# Patient Record
Sex: MALE | NOT HISPANIC OR LATINO | Employment: FULL TIME | URBAN - METROPOLITAN AREA
[De-identification: names, ages, dates, MRNs, and addresses within clinical notes are randomized per-mention and may not be internally consistent; named-entity substitution may affect disease eponyms.]

---

## 2018-06-13 ENCOUNTER — APPOINTMENT (OUTPATIENT)
Dept: PREADMISSION TESTING | Facility: HOSPITAL | Age: 38
End: 2018-06-13

## 2018-06-13 VITALS
OXYGEN SATURATION: 97 % | RESPIRATION RATE: 18 BRPM | TEMPERATURE: 97.4 F | WEIGHT: 204.5 LBS | BODY MASS INDEX: 29.28 KG/M2 | HEIGHT: 70 IN | SYSTOLIC BLOOD PRESSURE: 144 MMHG | DIASTOLIC BLOOD PRESSURE: 84 MMHG | HEART RATE: 96 BPM

## 2018-06-13 LAB
ANION GAP SERPL CALCULATED.3IONS-SCNC: 14 MMOL/L
BUN BLD-MCNC: 10 MG/DL (ref 6–20)
BUN/CREAT SERPL: 9.9 (ref 7–25)
CALCIUM SPEC-SCNC: 9.6 MG/DL (ref 8.6–10.5)
CHLORIDE SERPL-SCNC: 100 MMOL/L (ref 98–107)
CO2 SERPL-SCNC: 29 MMOL/L (ref 22–29)
CREAT BLD-MCNC: 1.01 MG/DL (ref 0.76–1.27)
DEPRECATED RDW RBC AUTO: 45.7 FL (ref 37–54)
ERYTHROCYTE [DISTWIDTH] IN BLOOD BY AUTOMATED COUNT: 12.9 % (ref 11.5–14.5)
GFR SERPL CREATININE-BSD FRML MDRD: 84 ML/MIN/1.73
GLUCOSE BLD-MCNC: 87 MG/DL (ref 65–99)
HCT VFR BLD AUTO: 44 % (ref 40.4–52.2)
HGB BLD-MCNC: 14.7 G/DL (ref 13.7–17.6)
MCH RBC QN AUTO: 32.3 PG (ref 27–32.7)
MCHC RBC AUTO-ENTMCNC: 33.4 G/DL (ref 32.6–36.4)
MCV RBC AUTO: 96.7 FL (ref 79.8–96.2)
PLATELET # BLD AUTO: 171 10*3/MM3 (ref 140–500)
PMV BLD AUTO: 10.1 FL (ref 6–12)
POTASSIUM BLD-SCNC: 4.5 MMOL/L (ref 3.5–5.2)
RBC # BLD AUTO: 4.55 10*6/MM3 (ref 4.6–6)
SODIUM BLD-SCNC: 143 MMOL/L (ref 136–145)
WBC NRBC COR # BLD: 4.37 10*3/MM3 (ref 4.5–10.7)

## 2018-06-13 PROCEDURE — 93010 ELECTROCARDIOGRAM REPORT: CPT | Performed by: INTERNAL MEDICINE

## 2018-06-13 PROCEDURE — 85027 COMPLETE CBC AUTOMATED: CPT | Performed by: OTOLARYNGOLOGY

## 2018-06-13 PROCEDURE — 93005 ELECTROCARDIOGRAM TRACING: CPT

## 2018-06-13 PROCEDURE — 80048 BASIC METABOLIC PNL TOTAL CA: CPT | Performed by: OTOLARYNGOLOGY

## 2018-06-13 PROCEDURE — 36415 COLL VENOUS BLD VENIPUNCTURE: CPT

## 2018-06-13 RX ORDER — ALPRAZOLAM 0.5 MG/1
0.5 TABLET ORAL AS NEEDED
COMMUNITY

## 2018-06-13 RX ORDER — PRAVASTATIN SODIUM 20 MG
20 TABLET ORAL DAILY
COMMUNITY
End: 2019-10-23 | Stop reason: SDUPTHER

## 2018-06-13 RX ORDER — CETIRIZINE HYDROCHLORIDE 10 MG/1
10 TABLET ORAL DAILY
COMMUNITY
End: 2018-06-19 | Stop reason: HOSPADM

## 2018-06-13 RX ORDER — ESCITALOPRAM OXALATE 20 MG/1
20 TABLET ORAL DAILY
COMMUNITY
End: 2019-10-23

## 2018-06-13 NOTE — DISCHARGE INSTRUCTIONS
Take the following medications the morning of surgery with a small sip of water:  NONE    THEY WILL CALL YOU THE DAY BEFORE WITH YOUR ARRIVAL TIME.    General Instructions:  • Do not eat solid food after midnight the night before surgery.  • You may drink clear liquids day of surgery but must stop at least one hour before your hospital arrival time.  • It is beneficial for you to have a clear drink that contains carbohydrates the day of surgery.  We suggest a 12 to 20 ounce bottle of Gatorade or Powerade for non-diabetic patients or a 12 to 20 ounce bottle of G2 or Powerade Zero for diabetic patients. (Pediatric patients, are not advised to drink a 12 to 20 ounce carbohydrate drink)    Clear liquids are liquids you can see through.  Nothing red in color.     Plain water                               Sports drinks  Sodas                                   Gelatin (Jell-O)  Fruit juices without pulp such as white grape juice and apple juice  Popsicles that contain no fruit or yogurt  Tea or coffee (no cream or milk added)  Gatorade / Powerade  G2 / Powerade Zero    • Infants may have breast milk up to four hours before surgery.  • Infants drinking formula may drink formula up to six hours before surgery.   • Patients who avoid smoking, chewing tobacco and alcohol for 4 weeks prior to surgery have a reduced risk of post-operative complications.  Quit smoking as many days before surgery as you can.  • Do not smoke, use chewing tobacco or drink alcohol the day of surgery.   • If applicable bring your C-PAP/ BI-PAP machine.  • Bring any papers given to you in the doctor’s office.  • Wear clean comfortable clothes and socks.  • Do not wear contact lenses or make-up.  Bring a case for your glasses.   • Bring crutches or walker if applicable.  • Remove all piercings.  Leave jewelry and any other valuables at home.  • Hair extensions with metal clips must be removed prior to surgery.  • The Pre-Admission Testing nurse will  instruct you to bring medications if unable to obtain an accurate list in Pre-Admission Testing.        If you were given a blood bank ID arm band remember to bring it with you the day of surgery.    Preventing a Surgical Site Infection:  • For 2 to 3 days before surgery, avoid shaving with a razor because the razor can irritate skin and make it easier to develop an infection.  • The night prior to surgery sleep in a clean bed with clean clothing.  Do not allow pets to sleep with you.  • Shower on the morning of surgery using a fresh bar of anti-bacterial soap (such as Dial) and clean washcloth.  Dry with a clean towel and dress in clean clothing.  • Ask your surgeon if you will be receiving antibiotics prior to surgery.  • Make sure you, your family, and all healthcare providers clean their hands with soap and water or an alcohol based hand  before caring for you or your wound.    Day of surgery:  Upon arrival, a Pre-op nurse and Anesthesiologist will review your health history, obtain vital signs, and answer questions you may have.  The only belongings needed at this time will be your home medications and if applicable your C-PAP/BI-PAP machine.  If you are staying overnight your family can leave the rest of your belongings in the car and bring them to your room later.  A Pre-op nurse will start an IV and you may receive medication in preparation for surgery, including something to help you relax.  Your family will be able to see you in the Pre-op area.  While you are in surgery your family should notify the waiting room  if they leave the waiting room area and provide a contact phone number.    Please be aware that surgery does come with discomfort.  We want to make every effort to control your discomfort so please discuss any uncontrolled symptoms with your nurse.   Your doctor will most likely have prescribed pain medications.      If you are going home after surgery you will receive  individualized written care instructions before being discharged.  A responsible adult must drive you to and from the hospital on the day of your surgery and stay with you for 24 hours.    If you are staying overnight following surgery, you will be transported to your hospital room following the recovery period.  [facility] has all private rooms.    If you have any questions please call Pre-Admission Testing at [phone].  Deductibles and co-payments are collected on the day of service. Please be prepared to pay the required co-pay, deductible or deposit on the day of service as defined by your plan.

## 2018-06-19 ENCOUNTER — ANESTHESIA (OUTPATIENT)
Dept: PERIOP | Facility: HOSPITAL | Age: 38
End: 2018-06-19

## 2018-06-19 ENCOUNTER — HOSPITAL ENCOUNTER (OUTPATIENT)
Facility: HOSPITAL | Age: 38
Setting detail: HOSPITAL OUTPATIENT SURGERY
Discharge: HOME | End: 2018-06-19
Attending: OTOLARYNGOLOGY | Admitting: OTOLARYNGOLOGY

## 2018-06-19 ENCOUNTER — ANESTHESIA EVENT (OUTPATIENT)
Dept: PERIOP | Facility: HOSPITAL | Age: 38
End: 2018-06-19

## 2018-06-19 VITALS
BODY MASS INDEX: 29.36 KG/M2 | RESPIRATION RATE: 16 BRPM | SYSTOLIC BLOOD PRESSURE: 147 MMHG | OXYGEN SATURATION: 96 % | HEART RATE: 65 BPM | DIASTOLIC BLOOD PRESSURE: 98 MMHG | TEMPERATURE: 98.2 F | WEIGHT: 204.59 LBS

## 2018-06-19 DIAGNOSIS — R06.83 SNORING: ICD-10-CM

## 2018-06-19 PROCEDURE — 25010000002 FENTANYL CITRATE (PF) 100 MCG/2ML SOLUTION: Performed by: NURSE ANESTHETIST, CERTIFIED REGISTERED

## 2018-06-19 PROCEDURE — 25010000002 ONDANSETRON PER 1 MG: Performed by: OTOLARYNGOLOGY

## 2018-06-19 PROCEDURE — 25010000002 MIDAZOLAM PER 1 MG: Performed by: ANESTHESIOLOGY

## 2018-06-19 PROCEDURE — 25010000002 DEXAMETHASONE PER 1 MG: Performed by: NURSE ANESTHETIST, CERTIFIED REGISTERED

## 2018-06-19 PROCEDURE — C1763 CONN TISS, NON-HUMAN: HCPCS | Performed by: OTOLARYNGOLOGY

## 2018-06-19 PROCEDURE — 25010000002 ONDANSETRON PER 1 MG: Performed by: NURSE ANESTHETIST, CERTIFIED REGISTERED

## 2018-06-19 PROCEDURE — 88304 TISSUE EXAM BY PATHOLOGIST: CPT | Performed by: OTOLARYNGOLOGY

## 2018-06-19 PROCEDURE — 25010000002 PROPOFOL 10 MG/ML EMULSION: Performed by: NURSE ANESTHETIST, CERTIFIED REGISTERED

## 2018-06-19 PROCEDURE — 25010000002 EPINEPHRINE PER 0.1 MG: Performed by: OTOLARYNGOLOGY

## 2018-06-19 RX ORDER — PROMETHAZINE HYDROCHLORIDE 25 MG/1
25 TABLET ORAL ONCE AS NEEDED
Status: DISCONTINUED | OUTPATIENT
Start: 2018-06-19 | End: 2018-06-19 | Stop reason: HOSPADM

## 2018-06-19 RX ORDER — ONDANSETRON 2 MG/ML
4 INJECTION INTRAMUSCULAR; INTRAVENOUS ONCE AS NEEDED
Status: COMPLETED | OUTPATIENT
Start: 2018-06-19 | End: 2018-06-19

## 2018-06-19 RX ORDER — PROMETHAZINE HYDROCHLORIDE 25 MG/ML
12.5 INJECTION, SOLUTION INTRAMUSCULAR; INTRAVENOUS ONCE AS NEEDED
Status: DISCONTINUED | OUTPATIENT
Start: 2018-06-19 | End: 2018-06-19 | Stop reason: HOSPADM

## 2018-06-19 RX ORDER — ONDANSETRON HYDROCHLORIDE 4 MG/5ML
4 SOLUTION ORAL EVERY 6 HOURS PRN
Qty: 50 ML | Refills: 1 | Status: SHIPPED | OUTPATIENT
Start: 2018-06-19 | End: 2019-10-23

## 2018-06-19 RX ORDER — HYDROCODONE BITARTRATE AND ACETAMINOPHEN 7.5; 325 MG/1; MG/1
1 TABLET ORAL ONCE AS NEEDED
Status: DISCONTINUED | OUTPATIENT
Start: 2018-06-19 | End: 2018-06-19 | Stop reason: HOSPADM

## 2018-06-19 RX ORDER — FENTANYL CITRATE 50 UG/ML
50 INJECTION, SOLUTION INTRAMUSCULAR; INTRAVENOUS
Status: DISCONTINUED | OUTPATIENT
Start: 2018-06-19 | End: 2018-06-19 | Stop reason: HOSPADM

## 2018-06-19 RX ORDER — PROPOFOL 10 MG/ML
VIAL (ML) INTRAVENOUS AS NEEDED
Status: DISCONTINUED | OUTPATIENT
Start: 2018-06-19 | End: 2018-06-19 | Stop reason: SURG

## 2018-06-19 RX ORDER — PROMETHAZINE HYDROCHLORIDE 25 MG/1
25 SUPPOSITORY RECTAL ONCE AS NEEDED
Status: DISCONTINUED | OUTPATIENT
Start: 2018-06-19 | End: 2018-06-19 | Stop reason: HOSPADM

## 2018-06-19 RX ORDER — OXYCODONE AND ACETAMINOPHEN 7.5; 325 MG/1; MG/1
1 TABLET ORAL ONCE AS NEEDED
Status: DISCONTINUED | OUTPATIENT
Start: 2018-06-19 | End: 2018-06-19 | Stop reason: HOSPADM

## 2018-06-19 RX ORDER — ONDANSETRON 2 MG/ML
4 INJECTION INTRAMUSCULAR; INTRAVENOUS ONCE AS NEEDED
Status: DISCONTINUED | OUTPATIENT
Start: 2018-06-19 | End: 2018-06-19 | Stop reason: HOSPADM

## 2018-06-19 RX ORDER — MIDAZOLAM HYDROCHLORIDE 1 MG/ML
1 INJECTION INTRAMUSCULAR; INTRAVENOUS
Status: DISCONTINUED | OUTPATIENT
Start: 2018-06-19 | End: 2018-06-19 | Stop reason: HOSPADM

## 2018-06-19 RX ORDER — LIDOCAINE HYDROCHLORIDE 10 MG/ML
0.5 INJECTION, SOLUTION EPIDURAL; INFILTRATION; INTRACAUDAL; PERINEURAL ONCE AS NEEDED
Status: COMPLETED | OUTPATIENT
Start: 2018-06-19 | End: 2018-06-19

## 2018-06-19 RX ORDER — HYDROMORPHONE HYDROCHLORIDE 1 MG/ML
0.5 INJECTION, SOLUTION INTRAMUSCULAR; INTRAVENOUS; SUBCUTANEOUS
Status: DISCONTINUED | OUTPATIENT
Start: 2018-06-19 | End: 2018-06-19 | Stop reason: HOSPADM

## 2018-06-19 RX ORDER — LABETALOL HYDROCHLORIDE 5 MG/ML
5 INJECTION, SOLUTION INTRAVENOUS
Status: DISCONTINUED | OUTPATIENT
Start: 2018-06-19 | End: 2018-06-19 | Stop reason: HOSPADM

## 2018-06-19 RX ORDER — ONDANSETRON 2 MG/ML
INJECTION INTRAMUSCULAR; INTRAVENOUS AS NEEDED
Status: DISCONTINUED | OUTPATIENT
Start: 2018-06-19 | End: 2018-06-19 | Stop reason: SURG

## 2018-06-19 RX ORDER — SODIUM CHLORIDE/ALOE VERA
GEL (GRAM) NASAL
Status: DISCONTINUED
Start: 2018-06-19 | End: 2018-06-19 | Stop reason: WASHOUT

## 2018-06-19 RX ORDER — MIDAZOLAM HYDROCHLORIDE 1 MG/ML
2 INJECTION INTRAMUSCULAR; INTRAVENOUS
Status: DISCONTINUED | OUTPATIENT
Start: 2018-06-19 | End: 2018-06-19 | Stop reason: HOSPADM

## 2018-06-19 RX ORDER — SODIUM CHLORIDE 0.9 % (FLUSH) 0.9 %
1-10 SYRINGE (ML) INJECTION AS NEEDED
Status: DISCONTINUED | OUTPATIENT
Start: 2018-06-19 | End: 2018-06-19 | Stop reason: HOSPADM

## 2018-06-19 RX ORDER — SCOLOPAMINE TRANSDERMAL SYSTEM 1 MG/1
1 PATCH, EXTENDED RELEASE TRANSDERMAL
Status: DISCONTINUED | OUTPATIENT
Start: 2018-06-19 | End: 2018-06-19 | Stop reason: HOSPADM

## 2018-06-19 RX ORDER — BUPIVACAINE HYDROCHLORIDE AND EPINEPHRINE 2.5; 5 MG/ML; UG/ML
INJECTION, SOLUTION INFILTRATION; PERINEURAL AS NEEDED
Status: DISCONTINUED | OUTPATIENT
Start: 2018-06-19 | End: 2018-06-19 | Stop reason: HOSPADM

## 2018-06-19 RX ORDER — DEXAMETHASONE SODIUM PHOSPHATE 10 MG/ML
INJECTION INTRAMUSCULAR; INTRAVENOUS AS NEEDED
Status: DISCONTINUED | OUTPATIENT
Start: 2018-06-19 | End: 2018-06-19 | Stop reason: SURG

## 2018-06-19 RX ORDER — NALOXONE HCL 0.4 MG/ML
0.2 VIAL (ML) INJECTION AS NEEDED
Status: DISCONTINUED | OUTPATIENT
Start: 2018-06-19 | End: 2018-06-19 | Stop reason: HOSPADM

## 2018-06-19 RX ORDER — EPINEPHRINE 1 MG/ML
INJECTION, SOLUTION, CONCENTRATE INTRAVENOUS AS NEEDED
Status: DISCONTINUED | OUTPATIENT
Start: 2018-06-19 | End: 2018-06-19 | Stop reason: HOSPADM

## 2018-06-19 RX ORDER — DIPHENHYDRAMINE HYDROCHLORIDE 50 MG/ML
12.5 INJECTION INTRAMUSCULAR; INTRAVENOUS
Status: DISCONTINUED | OUTPATIENT
Start: 2018-06-19 | End: 2018-06-19 | Stop reason: HOSPADM

## 2018-06-19 RX ORDER — BACITRACIN ZINC 500 [USP'U]/G
OINTMENT TOPICAL AS NEEDED
Status: DISCONTINUED | OUTPATIENT
Start: 2018-06-19 | End: 2018-06-19 | Stop reason: HOSPADM

## 2018-06-19 RX ORDER — FAMOTIDINE 10 MG/ML
20 INJECTION, SOLUTION INTRAVENOUS ONCE
Status: COMPLETED | OUTPATIENT
Start: 2018-06-19 | End: 2018-06-19

## 2018-06-19 RX ORDER — FENTANYL CITRATE 50 UG/ML
INJECTION, SOLUTION INTRAMUSCULAR; INTRAVENOUS AS NEEDED
Status: DISCONTINUED | OUTPATIENT
Start: 2018-06-19 | End: 2018-06-19 | Stop reason: SURG

## 2018-06-19 RX ORDER — EPHEDRINE SULFATE 50 MG/ML
5 INJECTION, SOLUTION INTRAVENOUS ONCE AS NEEDED
Status: DISCONTINUED | OUTPATIENT
Start: 2018-06-19 | End: 2018-06-19 | Stop reason: HOSPADM

## 2018-06-19 RX ORDER — PROMETHAZINE HYDROCHLORIDE 25 MG/1
12.5 TABLET ORAL ONCE AS NEEDED
Status: DISCONTINUED | OUTPATIENT
Start: 2018-06-19 | End: 2018-06-19 | Stop reason: HOSPADM

## 2018-06-19 RX ORDER — LIDOCAINE HYDROCHLORIDE AND EPINEPHRINE 10; 10 MG/ML; UG/ML
INJECTION, SOLUTION INFILTRATION; PERINEURAL AS NEEDED
Status: DISCONTINUED | OUTPATIENT
Start: 2018-06-19 | End: 2018-06-19 | Stop reason: HOSPADM

## 2018-06-19 RX ORDER — SODIUM CHLORIDE 9 MG/ML
INJECTION, SOLUTION INTRAVENOUS AS NEEDED
Status: DISCONTINUED | OUTPATIENT
Start: 2018-06-19 | End: 2018-06-19 | Stop reason: HOSPADM

## 2018-06-19 RX ORDER — SODIUM CHLORIDE, SODIUM LACTATE, POTASSIUM CHLORIDE, CALCIUM CHLORIDE 600; 310; 30; 20 MG/100ML; MG/100ML; MG/100ML; MG/100ML
9 INJECTION, SOLUTION INTRAVENOUS CONTINUOUS
Status: DISCONTINUED | OUTPATIENT
Start: 2018-06-19 | End: 2018-06-19 | Stop reason: HOSPADM

## 2018-06-19 RX ORDER — HYDROCODONE BITARTRATE AND ACETAMINOPHEN 10; 325 MG/1; MG/1
1 TABLET ORAL EVERY 6 HOURS PRN
Status: DISCONTINUED | OUTPATIENT
Start: 2018-06-19 | End: 2018-06-19 | Stop reason: HOSPADM

## 2018-06-19 RX ORDER — ROCURONIUM BROMIDE 10 MG/ML
INJECTION, SOLUTION INTRAVENOUS AS NEEDED
Status: DISCONTINUED | OUTPATIENT
Start: 2018-06-19 | End: 2018-06-19 | Stop reason: SURG

## 2018-06-19 RX ORDER — LIDOCAINE HYDROCHLORIDE 20 MG/ML
INJECTION, SOLUTION INFILTRATION; PERINEURAL AS NEEDED
Status: DISCONTINUED | OUTPATIENT
Start: 2018-06-19 | End: 2018-06-19 | Stop reason: SURG

## 2018-06-19 RX ORDER — FLUMAZENIL 0.1 MG/ML
0.2 INJECTION INTRAVENOUS AS NEEDED
Status: DISCONTINUED | OUTPATIENT
Start: 2018-06-19 | End: 2018-06-19 | Stop reason: HOSPADM

## 2018-06-19 RX ADMIN — ONDANSETRON 4 MG: 2 INJECTION INTRAMUSCULAR; INTRAVENOUS at 10:27

## 2018-06-19 RX ADMIN — LIDOCAINE HYDROCHLORIDE 100 MG: 20 INJECTION, SOLUTION INFILTRATION; PERINEURAL at 09:13

## 2018-06-19 RX ADMIN — SODIUM CHLORIDE, POTASSIUM CHLORIDE, SODIUM LACTATE AND CALCIUM CHLORIDE 9 ML/HR: 600; 310; 30; 20 INJECTION, SOLUTION INTRAVENOUS at 08:11

## 2018-06-19 RX ADMIN — FENTANYL CITRATE 100 MCG: 50 INJECTION, SOLUTION INTRAMUSCULAR; INTRAVENOUS at 09:13

## 2018-06-19 RX ADMIN — SUGAMMADEX 200 MG: 100 INJECTION, SOLUTION INTRAVENOUS at 11:01

## 2018-06-19 RX ADMIN — FENTANYL CITRATE 50 MCG: 50 INJECTION, SOLUTION INTRAMUSCULAR; INTRAVENOUS at 11:10

## 2018-06-19 RX ADMIN — FENTANYL CITRATE 50 MCG: 50 INJECTION, SOLUTION INTRAMUSCULAR; INTRAVENOUS at 11:29

## 2018-06-19 RX ADMIN — ROCURONIUM BROMIDE 50 MG: 10 INJECTION INTRAVENOUS at 09:13

## 2018-06-19 RX ADMIN — FAMOTIDINE 20 MG: 10 INJECTION, SOLUTION INTRAVENOUS at 08:12

## 2018-06-19 RX ADMIN — MIDAZOLAM 1 MG: 1 INJECTION INTRAMUSCULAR; INTRAVENOUS at 08:17

## 2018-06-19 RX ADMIN — FENTANYL CITRATE 50 MCG: 50 INJECTION, SOLUTION INTRAMUSCULAR; INTRAVENOUS at 10:27

## 2018-06-19 RX ADMIN — FENTANYL CITRATE 50 MCG: 50 INJECTION, SOLUTION INTRAMUSCULAR; INTRAVENOUS at 11:39

## 2018-06-19 RX ADMIN — SCOPOLAMINE 1 PATCH: 1 PATCH, EXTENDED RELEASE TRANSDERMAL at 08:12

## 2018-06-19 RX ADMIN — LIDOCAINE HYDROCHLORIDE 0.5 ML: 10 INJECTION, SOLUTION EPIDURAL; INFILTRATION; INTRACAUDAL; PERINEURAL at 08:11

## 2018-06-19 RX ADMIN — FENTANYL CITRATE 50 MCG: 50 INJECTION, SOLUTION INTRAMUSCULAR; INTRAVENOUS at 10:59

## 2018-06-19 RX ADMIN — DEXAMETHASONE SODIUM PHOSPHATE 8 MG: 10 INJECTION INTRAMUSCULAR; INTRAVENOUS at 10:27

## 2018-06-19 RX ADMIN — PROPOFOL 200 MG: 10 INJECTION, EMULSION INTRAVENOUS at 09:13

## 2018-06-19 RX ADMIN — ONDANSETRON 4 MG: 2 INJECTION, SOLUTION INTRAMUSCULAR; INTRAVENOUS at 12:41

## 2018-06-19 RX ADMIN — MIDAZOLAM 1 MG: 1 INJECTION INTRAMUSCULAR; INTRAVENOUS at 08:12

## 2018-06-19 RX ADMIN — HYDROCODONE BITARTRATE AND ACETAMINOPHEN 15 ML: 7.5; 325 SOLUTION ORAL at 12:01

## 2018-06-19 RX ADMIN — PROPOFOL 100 MG: 10 INJECTION, EMULSION INTRAVENOUS at 09:16

## 2018-06-19 NOTE — BRIEF OP NOTE
SEPTOPLASTY, TONSILLECTOMY AND ADENOIDECTOMY  Progress Note    Altagracia Berry  6/19/2018    Pre-op Diagnosis:   Sleep apnea; nasal obstruction       Post-Op Diagnosis Codes:   same    Procedure/CPT® Codes:      Procedure(s):  SEPTOPLASTY TURBINATE COBLATION  TONSILLECTOMY    Surgeon(s):  Delano Nunez MD    Anesthesia: General    Staff:   Circulator: Melonie Ascenico RN; Nimo Gant RN  Scrub Person: Mi Aguirre    Estimated Blood Loss: 200 mL    Urine Voided: 0 mL    Specimens:                ID Type Source Tests Collected by Time   A : Right Tonsil Tissue Tonsils TISSUE PATHOLOGY EXAM Delano Nunez MD 6/19/2018 1037   B : Left Tonsil Tissue Tonsils TISSUE PATHOLOGY EXAM Delano Nunez MD 6/19/2018 1037         Drains:      Findings: severely deviated septum with multiple fractures of quadrangular cartilage    Complications: none      Delano Nunez MD     Date: 6/19/2018  Time: 11:19 AM

## 2018-06-19 NOTE — ANESTHESIA PROCEDURE NOTES
Airway  Urgency: elective    Airway not difficult    General Information and Staff    Patient location during procedure: OR  Anesthesiologist: TYLOR YOUNG  CRNA: DARY VALDEZ    Indications and Patient Condition  Indications for airway management: airway protection    Preoxygenated: yes  MILS not maintained throughout  Mask difficulty assessment: 1 - vent by mask    Final Airway Details  Final airway type: endotracheal airway      Successful airway: ETT and LUCIA tube  Cuffed: yes   Successful intubation technique: direct laryngoscopy  Facilitating devices/methods: intubating stylet  Endotracheal tube insertion site: oral  Blade: Casas  Blade size: #2  ETT size: 7.5 mm  Cormack-Lehane Classification: grade I - full view of glottis  Placement verified by: chest auscultation and capnometry   Cuff volume (mL): 10  Measured from: lips  Number of attempts at approach: 1    Additional Comments  Ett placed easily, appears atraumatic, dentition intact

## 2018-06-19 NOTE — OP NOTE
Operative note    Preoperative diagnosis: Deviated nasal septum with nasal obstruction; obstructive sleep apnea; tonsillar hypertrophy; bilateral inferior turbinate hypertrophy    Postoperative diagnosis: Same    Surgeon: Himanshu Nunez    Procedure: Septoplasty, submucous resection technique; bilateral inferior turbinate Coblation; tonsillectomy; palatoplasty    Complications: None    Blood loss: Minimal    Specimen: Tonsils ×2    Description: Patient was placed supine on the operating table where general anesthetic was administered.  The septum was injected with 1% lidocaine 1 200,000 epinephrine.    The patient was prepped and draped in sterile fashion.    Vertical septal incision was made very carefully on the left.  The patient's quadrangular cartilage was buckled and fractured with severe angulation.  We completed very meticulous elevation of the soft tissue off these collagenous fragments.  There were some areas of exposed cartilage on the left side thus leaving a small hole in the mucosal flap.  We  the bony cartilaginous junction and then removed segments of severely deviated bone with Og forceps.  I then began meticulously dissecting the fragments of cartilage with a sharp [place] elevator fragments were removed as necessary.  The very anterior portion of the cartilage was somewhat crushed and deviated to the right.  We are we aligned this to the extent possible.    Once we were pleased with the positioning of the septum we irrigated the pocket with cool saline.  I closed the vertical incision very carefully with interrupted 4-0 chromic suture.  I then used a 40 plain suture on a small Jamar needle to further support the cartilaginous fragments and close the septal flap.    We flushed the nasal cavity with saline.  Inferior turbinate Coblation was then completed in the following manner.  The turbinate mucosa was injected with 1% lidocaine 1 200,000 epinephrine to inflate the erectile tissue  of the structure.    The ArthroCare inferior turbinate Coblation wand was then submitted into the membranous portion of the turbinate to its fullest extent.  5 isolated lesions of approximately 8 seconds duration were completed at a setting of 4 Coblation.    Good tissue volume reduction was observed.    Inferior meatal packs were then placed and the strings from the packs were secured to the skin of the face.    We then repeat positioned for the work in the pharynx.  The Crow Ben gag was introduced into the oral cavity.  The tonsils were removed in standard fashion with needlepoint cautery bleeding was minimal I then made a posterior backcut into the posterior tonsillar pillar on both sides this created a rectangular shaped opening into the pharynx I trimmed approximately one third the length of the uvula.    All mucosal cuts were then closed meticulously with interrupted 3-0 Vicryl suture we again flushed with saline the gag was withdrawn we checked for bleeding.  The patient was awakened and returned to recovery.  All of her needle counts were correct.

## 2018-06-19 NOTE — ANESTHESIA POSTPROCEDURE EVALUATION
Patient: Altagracia Berry    Procedure Summary     Date:  06/19/18 Room / Location:   [facility]    Anesthesia Start:  0910 Anesthesia Stop:  1119    Procedures:       SEPTOPLASTY TURBINATE COBLATION (Bilateral Nose)      TONSILLECTOMY (Bilateral Throat) Diagnosis:      Surgeon:  Delano Nunez MD Provider:  Patricio Arreaga MD    Anesthesia Type:  general ASA Status:  2          Anesthesia Type: general  Last vitals  BP   150/96 (06/19/18 1145)   Temp   36.4 °C (97.6 °F) (06/19/18 1118)   Pulse   75 (06/19/18 1145)   Resp   16 (06/19/18 1145)     SpO2   94 % (06/19/18 1145)     Post Anesthesia Care and Evaluation    Patient location during evaluation: PACU  Patient participation: complete - patient participated  Level of consciousness: awake and alert  Pain management: adequate  Airway patency: patent  Anesthetic complications: No anesthetic complications    Cardiovascular status: acceptable  Respiratory status: acceptable  Hydration status: acceptable    Comments: --------------------            06/19/18               1145     --------------------   BP:       150/96     Pulse:      75       Resp:       16       Temp:                SpO2:      94%      --------------------

## 2018-06-19 NOTE — ANESTHESIA PREPROCEDURE EVALUATION
Anesthesia Evaluation     Patient summary reviewed and Nursing notes reviewed                Airway   Mallampati: II  Dental      Pulmonary    (+) a smoker Former, sleep apnea,   Cardiovascular     ECG reviewed  Rhythm: regular  Rate: normal    (+) hyperlipidemia,       Neuro/Psych  (+) headaches, psychiatric history,     GI/Hepatic/Renal/Endo - negative ROS     Musculoskeletal (-) negative ROS    Abdominal    Substance History - negative use     OB/GYN negative ob/gyn ROS         Other                        Anesthesia Plan    ASA 2     general     intravenous induction   Anesthetic plan and risks discussed with patient.

## 2018-06-20 LAB
CYTO UR: NORMAL
LAB AP CASE REPORT: NORMAL
PATH REPORT.FINAL DX SPEC: NORMAL
PATH REPORT.GROSS SPEC: NORMAL

## 2019-10-21 RX ORDER — CETIRIZINE HYDROCHLORIDE 10 MG/1
10 TABLET ORAL DAILY
Start: 2019-10-21

## 2019-10-21 RX ORDER — SILDENAFIL 100 MG/1
100 TABLET, FILM COATED ORAL DAILY PRN
Start: 2019-10-21

## 2019-10-23 ENCOUNTER — OFFICE VISIT (OUTPATIENT)
Dept: FAMILY MEDICINE CLINIC | Facility: CLINIC | Age: 39
End: 2019-10-23

## 2019-10-23 VITALS
OXYGEN SATURATION: 97 % | BODY MASS INDEX: 31.07 KG/M2 | WEIGHT: 217 LBS | TEMPERATURE: 98.4 F | HEART RATE: 80 BPM | DIASTOLIC BLOOD PRESSURE: 91 MMHG | HEIGHT: 70 IN | SYSTOLIC BLOOD PRESSURE: 143 MMHG

## 2019-10-23 DIAGNOSIS — F41.1 GAD (GENERALIZED ANXIETY DISORDER): ICD-10-CM

## 2019-10-23 DIAGNOSIS — N52.9 ERECTILE DYSFUNCTION, UNSPECIFIED ERECTILE DYSFUNCTION TYPE: ICD-10-CM

## 2019-10-23 DIAGNOSIS — R00.0 TACHYCARDIA: ICD-10-CM

## 2019-10-23 DIAGNOSIS — E78.2 MIXED HYPERLIPIDEMIA: ICD-10-CM

## 2019-10-23 DIAGNOSIS — Z23 NEED FOR INFLUENZA VACCINATION: Primary | ICD-10-CM

## 2019-10-23 PROCEDURE — 90471 IMMUNIZATION ADMIN: CPT | Performed by: FAMILY MEDICINE

## 2019-10-23 PROCEDURE — 90674 CCIIV4 VAC NO PRSV 0.5 ML IM: CPT | Performed by: FAMILY MEDICINE

## 2019-10-23 PROCEDURE — 99214 OFFICE O/P EST MOD 30 MIN: CPT | Performed by: FAMILY MEDICINE

## 2019-10-23 RX ORDER — VILAZODONE HYDROCHLORIDE 40 MG/1
40 TABLET ORAL DAILY
Refills: 2 | COMMUNITY
Start: 2019-09-03

## 2019-10-23 RX ORDER — PRAVASTATIN SODIUM 20 MG
20 TABLET ORAL DAILY
Qty: 90 TABLET | Refills: 3 | Status: SHIPPED | OUTPATIENT
Start: 2019-10-23 | End: 2020-11-03

## 2019-10-23 RX ORDER — TRAZODONE HYDROCHLORIDE 100 MG/1
100 TABLET ORAL
Refills: 1 | COMMUNITY
Start: 2019-09-03

## 2019-10-23 RX ORDER — BUPROPION HYDROCHLORIDE 150 MG/1
150 TABLET ORAL DAILY
Refills: 1 | COMMUNITY
Start: 2019-10-02

## 2019-10-23 NOTE — PROGRESS NOTES
Chief Complaint   Patient presents with   • Depression   • Anxiety   • Hyperlipidemia       Subjective   Altagracia Berry is a 38 y.o. male.     History of Present Illness   F/U LUCÍA.  Seeing [facility] psychiatry now.  I am doing well with [employer] campaign.    F/U hyperlipidemia.  Doing well with meds.    F/U ED.  Doing well with meds.  I am doing Clomid for the low testosterone through the urology.    Fu elevated BP.  Checking BP at home and numbers running 100-110/60s and doing well.    C/o trouble with fast heartrate that occurs about every two months or so.  Lasts minutes.  Occurs with exercise.  Stress treadmill normal in the past.  Picked up on apple watch with HR up to 200 or so.    Resolved in 2 minutes or so with rest.      The following portions of the patient's history were reviewed and updated as appropriate: allergies, current medications, past family history, past medical history, past social history, past surgical history and problem list.    Review of Systems   Constitutional: Negative for appetite change and fatigue.   HENT: Negative for nosebleeds and sore throat.    Eyes: Negative for blurred vision and visual disturbance.   Respiratory: Negative for shortness of breath and wheezing.    Cardiovascular: Negative for chest pain and leg swelling.   Gastrointestinal: Negative for abdominal distention and abdominal pain.   Endocrine: Negative for cold intolerance and polyuria.   Genitourinary: Negative for dysuria and hematuria.   Musculoskeletal: Negative for arthralgias and myalgias.   Skin: Negative for color change and rash.   Neurological: Negative for weakness and confusion.   Psychiatric/Behavioral: Negative for agitation and depressed mood.       Patient Active Problem List   Diagnosis   • LUCÍA (generalized anxiety disorder)   • Erectile dysfunction   • Allergic rhinitis   • Hyperlipidemia   • Panic attacks   • Sleep apnea   • Thrombocytopenia (CMS/HCC)   • Tachycardia       No Known  Allergies      Current Outpatient Medications:   •  ALPRAZolam (XANAX) 0.5 MG tablet, Take 0.5 mg by mouth As Needed for Anxiety., Disp: , Rfl:   •  buPROPion XL (WELLBUTRIN XL) 150 MG 24 hr tablet, Take 150 mg by mouth Daily., Disp: , Rfl: 1  •  cetirizine (zyrTEC) 10 MG tablet, Take 1 tablet by mouth Daily., Disp: , Rfl:   •  clomiPHENE (CLOMID) 50 MG tablet, Take 25 mg by mouth Daily., Disp: , Rfl: 5  •  pravastatin (PRAVACHOL) 20 MG tablet, Take 1 tablet by mouth Daily., Disp: 90 tablet, Rfl: 3  •  sildenafil (VIAGRA) 100 MG tablet, Take 1 tablet by mouth Daily As Needed for erectile dysfunction., Disp: , Rfl:   •  traZODone (DESYREL) 100 MG tablet, Take 100 mg by mouth every night at bedtime., Disp: , Rfl: 1  •  VIIBRYD 40 MG tablet tablet, Take 40 mg by mouth Daily., Disp: , Rfl: 2  •  HYDROcodone-acetaminophen (HYCET) 7.5-325 MG/15ML solution, Take 15 mL by mouth Every 4 (Four) Hours As Needed for Moderate Pain ., Disp: 400 mL, Rfl: 0  No current facility-administered medications for this visit.     Past Medical History:   Diagnosis Date   • Allergic rhinitis    • Anxiety and depression    • Decreased libido    • Deviated septum    • Eczema    • Erectile dysfunction    • LUCÍA (generalized anxiety disorder)    • History of exercise stress test    • History of myringotomy    • History of palpitations     SAW DR DELONG, HAD NEGATIVE STRESS TEST, WAS DETERMINED TO BE ANXIETY   • Hypercholesterolemia    • Hyperlipidemia    • Hypertrophy tonsils    • Immunization deficiency    • Migraine    • Palpitations    • Panic attacks    • Patellofemoral syndrome    • Sebaceous cyst    • Sleep apnea    • Sore throat    • Thrombocytopenia (CMS/HCC)        Past Surgical History:   Procedure Laterality Date   • MYRINGOTOMY      x 2   • SEPTOPLASTY Bilateral 6/19/2018    Procedure: SEPTOPLASTY TURBINATE COBLATION;  Surgeon: Delano Nunez MD;  Location: [facility] OR [facility] [place];  Service: ENT   • TONSILLECTOMY AND ADENOIDECTOMY  Bilateral 6/19/2018    Procedure: TONSILLECTOMY;  Surgeon: Delano Nunez MD;  [place] [facility] [place] [facility] [place]   • WISDOM TOOTH EXTRACTION         Family History   Problem Relation Age of Onset   • Breast cancer Mother    • Leukemia Father         chronic lymphoid leukemia   • No Known Problems Other    • Malig Hyperthermia Neg Hx        Social History     Tobacco Use   • Smoking status: Former Smoker     Packs/day: 1.00     Years: 9.00     Pack years: 9.00     Types: Cigarettes   • Smokeless tobacco: Current User     Types: Chew   • Tobacco comment: QUIT 9 YEARS AGO   Substance Use Topics   • Alcohol use: Yes     Comment: SOCIALLY-2 drinks per week            Objective     Vitals:    10/23/19 0913   BP: 143/91   Pulse: 80   Temp: 98.4 °F (36.9 °C)   SpO2: 97%     Body mass index is 31.14 kg/m².    Physical Exam   Constitutional: He appears well-developed and well-nourished.   HENT:   Head: Normocephalic and atraumatic.   Mouth/Throat: Oropharynx is clear and moist.   Eyes: Pupils are equal, round, and reactive to light. No scleral icterus.   Neck: No thyromegaly present.   Cardiovascular: Normal rate and regular rhythm. Exam reveals no gallop and no friction rub.   No murmur heard.  Pulmonary/Chest: Effort normal. No respiratory distress. He has no wheezes. He has no rales. He exhibits no tenderness.   Abdominal: Soft. Bowel sounds are normal. He exhibits no distension. There is no tenderness.   Musculoskeletal: Normal range of motion. He exhibits no edema or deformity.   Lymphadenopathy:     He has no cervical adenopathy.   Neurological: No cranial nerve deficit. He exhibits normal muscle tone.   Skin: Skin is warm and dry. No rash noted. He is not diaphoretic.   Vitals reviewed.      Lab Results   Component Value Date    GLUCOSE 87 06/13/2018    BUN 10 06/13/2018    CREATININE 1.01 06/13/2018    EGFRIFNONA 84 06/13/2018    BCR 9.9 06/13/2018    K 4.5 06/13/2018    CO2 29.0 06/13/2018    CALCIUM 9.6  06/13/2018       WBC   Date Value Ref Range Status   06/13/2018 4.37 (L) 4.50 - 10.70 10*3/mm3 Final     RBC   Date Value Ref Range Status   06/13/2018 4.55 (L) 4.60 - 6.00 10*6/mm3 Final     Hemoglobin   Date Value Ref Range Status   06/13/2018 14.7 13.7 - 17.6 g/dL Final     Hematocrit   Date Value Ref Range Status   06/13/2018 44.0 40.4 - 52.2 % Final     MCV   Date Value Ref Range Status   06/13/2018 96.7 (H) 79.8 - 96.2 fL Final     MCH   Date Value Ref Range Status   06/13/2018 32.3 27.0 - 32.7 pg Final     MCHC   Date Value Ref Range Status   06/13/2018 33.4 32.6 - 36.4 g/dL Final     RDW   Date Value Ref Range Status   06/13/2018 12.9 11.5 - 14.5 % Final     RDW-SD   Date Value Ref Range Status   06/13/2018 45.7 37.0 - 54.0 fl Final     MPV   Date Value Ref Range Status   06/13/2018 10.1 6.0 - 12.0 fL Final     Platelets   Date Value Ref Range Status   06/13/2018 171 140 - 500 10*3/mm3 Final       No results found for: HGBA1C    No results found for: GBBLGWJS17    No results found for: TSH    No results found for: CHOL  No results found for: TRIG  No results found for: HDL  No results found for: LDL  No results found for: VLDL  No results found for: LDLHDL      Procedures    Assessment/Plan   Problems Addressed this Visit        Cardiovascular and Mediastinum    Hyperlipidemia    Relevant Medications    pravastatin (PRAVACHOL) 20 MG tablet    Other Relevant Orders    Comprehensive Metabolic Panel    Lipid Panel With / Chol / HDL Ratio    TSH Rfx On Abnormal To Free T4    Tachycardia       Other    LUCÍA (generalized anxiety disorder)    Relevant Medications    buPROPion XL (WELLBUTRIN XL) 150 MG 24 hr tablet    traZODone (DESYREL) 100 MG tablet    VIIBRYD 40 MG tablet tablet    Erectile dysfunction      Other Visit Diagnoses     Need for influenza vaccination    -  Primary    Relevant Medications    influenza vac split quad (FLUZONE,FLUARIX,AFLURIA) injection 0.5 mL (Completed)          Orders Placed This  Encounter   Procedures   • Comprehensive Metabolic Panel   • Lipid Panel With / Chol / HDL Ratio   • TSH Rfx On Abnormal To Free T4       Current Outpatient Medications   Medication Sig Dispense Refill   • ALPRAZolam (XANAX) 0.5 MG tablet Take 0.5 mg by mouth As Needed for Anxiety.     • buPROPion XL (WELLBUTRIN XL) 150 MG 24 hr tablet Take 150 mg by mouth Daily.  1   • cetirizine (zyrTEC) 10 MG tablet Take 1 tablet by mouth Daily.     • clomiPHENE (CLOMID) 50 MG tablet Take 25 mg by mouth Daily.  5   • pravastatin (PRAVACHOL) 20 MG tablet Take 1 tablet by mouth Daily. 90 tablet 3   • sildenafil (VIAGRA) 100 MG tablet Take 1 tablet by mouth Daily As Needed for erectile dysfunction.     • traZODone (DESYREL) 100 MG tablet Take 100 mg by mouth every night at bedtime.  1   • VIIBRYD 40 MG tablet tablet Take 40 mg by mouth Daily.  2   • HYDROcodone-acetaminophen (HYCET) 7.5-325 MG/15ML solution Take 15 mL by mouth Every 4 (Four) Hours As Needed for Moderate Pain . 400 mL 0     No current facility-administered medications for this visit.        We administered influenza vac split quad.    Return in about 6 months (around 4/23/2020).    There are no Patient Instructions on file for this visit.

## 2019-10-24 LAB
ALBUMIN SERPL-MCNC: 5 G/DL (ref 3.5–5.2)
ALBUMIN/GLOB SERPL: 2.1 G/DL
ALP SERPL-CCNC: 47 U/L (ref 39–117)
ALT SERPL-CCNC: 33 U/L (ref 1–41)
AST SERPL-CCNC: 22 U/L (ref 1–40)
BILIRUB SERPL-MCNC: 0.2 MG/DL (ref 0.2–1.2)
BUN SERPL-MCNC: 11 MG/DL (ref 6–20)
BUN/CREAT SERPL: 8.5 (ref 7–25)
CALCIUM SERPL-MCNC: 9.6 MG/DL (ref 8.6–10.5)
CHLORIDE SERPL-SCNC: 103 MMOL/L (ref 98–107)
CHOLEST SERPL-MCNC: 170 MG/DL (ref 0–200)
CHOLEST/HDLC SERPL: 3.95 {RATIO}
CO2 SERPL-SCNC: 27.8 MMOL/L (ref 22–29)
CREAT SERPL-MCNC: 1.29 MG/DL (ref 0.76–1.27)
GLOBULIN SER CALC-MCNC: 2.4 GM/DL
GLUCOSE SERPL-MCNC: 104 MG/DL (ref 65–99)
HDLC SERPL-MCNC: 43 MG/DL (ref 40–60)
LDLC SERPL CALC-MCNC: 111 MG/DL (ref 0–100)
POTASSIUM SERPL-SCNC: 4.8 MMOL/L (ref 3.5–5.2)
PROT SERPL-MCNC: 7.4 G/DL (ref 6–8.5)
SODIUM SERPL-SCNC: 143 MMOL/L (ref 136–145)
TRIGL SERPL-MCNC: 82 MG/DL (ref 0–150)
TSH SERPL DL<=0.005 MIU/L-ACNC: 2.95 UIU/ML (ref 0.27–4.2)
VLDLC SERPL CALC-MCNC: 16.4 MG/DL

## 2019-10-24 NOTE — PROGRESS NOTES
Your labs look great.  The slightly elevated creatinine is of no concern(as some labs go up to 1.3 as normal).  The lgucose and LDL are of no concern as well.  Stay on the same plan as we discussed.  Thanks.

## 2019-11-11 ENCOUNTER — OFFICE VISIT (OUTPATIENT)
Dept: CARDIOLOGY | Facility: CLINIC | Age: 39
End: 2019-11-11

## 2019-11-11 VITALS
HEART RATE: 72 BPM | HEIGHT: 70 IN | SYSTOLIC BLOOD PRESSURE: 130 MMHG | BODY MASS INDEX: 30.64 KG/M2 | WEIGHT: 214 LBS | DIASTOLIC BLOOD PRESSURE: 84 MMHG

## 2019-11-11 DIAGNOSIS — R00.0 TACHYCARDIA: ICD-10-CM

## 2019-11-11 DIAGNOSIS — R00.2 PALPITATIONS: Primary | ICD-10-CM

## 2019-11-11 PROCEDURE — 99203 OFFICE O/P NEW LOW 30 MIN: CPT | Performed by: INTERNAL MEDICINE

## 2019-11-11 PROCEDURE — 93000 ELECTROCARDIOGRAM COMPLETE: CPT | Performed by: INTERNAL MEDICINE

## 2019-11-11 NOTE — PROGRESS NOTES
Subjective:     Encounter Date:11/11/19      Patient ID: Altagracia Berry is a 39 y.o. male.    Chief Complaint:palpitations and tachycardia  History of Present Illness    Dear Dr. Layne,    I had the pleasure of seeing this patient in the office today for initial evaluation and consultation.  I appreciate that you sent him in to see us.  They come in today to be seen for palpitations and tachycardia.    Patient states for many years he has had episodes were every now and then his heart rate would suddenly jump up.  This is exclusively associated with exercise.  He thinks it is a regular rather than irregular but the duration is short.  As soon as occurs he will sit down take some deep breaths and then his heart rate is back to normal.  He was previously seen in 2017 by Dr. Quintero.  He had a stress test performed that was normal.  He states he will very rarely actually have these episodes.  Sometimes months will go by without it occurring.  He exercises most days of the week.  He now has an apple watch and one time he had his watch on one when the spells occurred.  His heart rate jumped up in the watch said it was about 190 and the next reading was about 160 or 170 and then the next reading he has is during cooldown with his heart rate is 60.  He does not have a current generation  iWatch.  When 1 of these episodes occurs he feels weak but otherwise has no symptoms.  He is never passed out.  No family history of sudden death.    He has no known cardiac history.  This patient has no history of coronary artery disease, congestive heart failure, rheumatic fever, rheumatic heart disease, congenital heart disease or heart murmur.  This patient has never required invasive cardiovascular evaluation.      The following portions of the patient's history were reviewed and updated as appropriate: allergies, current medications, past family history, past medical history, past social history, past surgical history and  problem list.    Past Medical History:   Diagnosis Date   • Allergic rhinitis    • Anxiety and depression    • Decreased libido    • Deviated septum    • Eczema    • Erectile dysfunction    • LUCÍA (generalized anxiety disorder)    • History of exercise stress test    • History of myringotomy    • History of palpitations     SAW DR DELONG, HAD NEGATIVE STRESS TEST, WAS DETERMINED TO BE ANXIETY   • Hypercholesterolemia    • Hyperlipidemia    • Hypertrophy tonsils    • Immunization deficiency    • Migraine    • Palpitations    • Panic attacks    • Patellofemoral syndrome    • Sebaceous cyst    • Sleep apnea    • Sore throat    • Thrombocytopenia (CMS/HCC)        Past Surgical History:   Procedure Laterality Date   • EAR TUBES     • MYRINGOTOMY      x 2   • SEPTOPLASTY Bilateral 6/19/2018    Procedure: SEPTOPLASTY TURBINATE COBLATION;  Surgeon: Delano Nunez MD;  [place] [facility] [place] [facility] [place]  Service: ENT   • TONSILLECTOMY AND ADENOIDECTOMY Bilateral 6/19/2018    Procedure: TONSILLECTOMY;  Surgeon: Delano Nunez MD;  [place] [facility] [place] [facility] [place];  Service: ENT   • WISDOM TOOTH EXTRACTION         Social History     Socioeconomic History   • Marital status:      Spouse name: Not on file   • Number of children: Not on file   • Years of education: Not on file   • Highest education level: Not on file   Tobacco Use   • Smoking status: Former Smoker     Packs/day: 1.00     Years: 9.00     Pack years: 9.00     Types: Cigarettes   • Smokeless tobacco: Current User     Types: Chew   • Tobacco comment: QUIT 9 YEARS AGO   Substance and Sexual Activity   • Alcohol use: Yes     Comment: SOCIALLY-2 drinks per week   • Drug use: No   • Sexual activity: Defer       Review of Systems   Constitution: Negative for chills, decreased appetite, fever and night sweats.   HENT: Negative for ear discharge, ear pain, hearing loss, nosebleeds and sore throat.    Eyes: Negative for blurred vision, double vision and pain.  "  Cardiovascular: Negative for cyanosis.   Respiratory: Negative for hemoptysis and sputum production.    Endocrine: Negative for cold intolerance and heat intolerance.   Hematologic/Lymphatic: Negative for adenopathy.   Skin: Negative for dry skin, itching, nail changes, rash and suspicious lesions.   Musculoskeletal: Negative for arthritis, gout, muscle cramps, muscle weakness, myalgias and neck pain.   Gastrointestinal: Negative for anorexia, bowel incontinence, constipation, diarrhea, dysphagia, hematemesis and jaundice.   Genitourinary: Negative for bladder incontinence, dysuria, flank pain, frequency, hematuria and nocturia.   Neurological: Negative for focal weakness, numbness, paresthesias and seizures.   Psychiatric/Behavioral: Negative for altered mental status, hallucinations, hypervigilance, suicidal ideas and thoughts of violence.   Allergic/Immunologic: Negative for persistent infections.         ECG 12 Lead  Date/Time: 11/11/2019 9:36 AM  Performed by: Shane Hdz III, MD  Authorized by: Shane Hdz III, MD   Comparison: compared with previous ECG   Similar to previous ECG  Rhythm: sinus rhythm  Rate: normal  Conduction: conduction normal  ST Segments: ST segments normal  T Waves: T waves normal  QRS axis: normal  Other: no other findings    Clinical impression: normal ECG               Objective:     Vitals:    11/11/19 0920   BP: 130/84   Pulse: 72   Weight: 97.1 kg (214 lb)   Height: 177.8 cm (70\")         Physical Exam   Constitutional: He is oriented to person, place, and time. He appears well-developed and well-nourished. No distress.   HENT:   Head: Normocephalic and atraumatic.   Nose: Nose normal.   Mouth/Throat: Oropharynx is clear and moist.   Eyes: Conjunctivae and EOM are normal. Pupils are equal, round, and reactive to light. Right eye exhibits no discharge. Left eye exhibits no discharge.   Neck: Normal range of motion. Neck supple. No tracheal deviation present. No thyromegaly " present.   Cardiovascular: Normal rate, regular rhythm, S1 normal, S2 normal, normal heart sounds and normal pulses. Exam reveals no S3.   Pulmonary/Chest: Effort normal and breath sounds normal. No stridor. No respiratory distress. He exhibits no tenderness.   Abdominal: Soft. Bowel sounds are normal. He exhibits no distension and no mass. There is no tenderness. There is no rebound and no guarding.   Musculoskeletal: Normal range of motion. He exhibits no tenderness or deformity.   Lymphadenopathy:     He has no cervical adenopathy.   Neurological: He is alert and oriented to person, place, and time. He has normal reflexes.   Skin: Skin is warm and dry. No rash noted. He is not diaphoretic. No erythema.   Psychiatric: He has a normal mood and affect. Thought content normal.       Lab Review:             Performed        Assessment:          Diagnosis Plan   1. Palpitations  ECG 12 Lead   2. Tachycardia  ECG 12 Lead          Plan:       Tachycardia- fairly infrequent.  We need to capture 1 of these episodes.  I am going to call over to his prior cardiologist to get his diagnostic testing.  We discussed different testing options.  He is going to look into the [employer] monitor.    Thank you very much for allowing us to participate in the care of this pleasant patient.  Please don't hesitate to call if I can be of assistance in any way.      Current Outpatient Medications:   •  ALPRAZolam (XANAX) 0.5 MG tablet, Take 0.5 mg by mouth As Needed for Anxiety., Disp: , Rfl:   •  buPROPion XL (WELLBUTRIN XL) 150 MG 24 hr tablet, Take 150 mg by mouth Daily., Disp: , Rfl: 1  •  cetirizine (zyrTEC) 10 MG tablet, Take 1 tablet by mouth Daily., Disp: , Rfl:   •  clomiPHENE (CLOMID) 50 MG tablet, Take 25 mg by mouth Daily., Disp: , Rfl: 5  •  pravastatin (PRAVACHOL) 20 MG tablet, Take 1 tablet by mouth Daily., Disp: 90 tablet, Rfl: 3  •  sildenafil (VIAGRA) 100 MG tablet, Take 1 tablet by mouth Daily As Needed for erectile  dysfunction., Disp: , Rfl:   •  traZODone (DESYREL) 100 MG tablet, Take 100 mg by mouth every night at bedtime., Disp: , Rfl: 1  •  VIIBRYD 40 MG tablet tablet, Take 40 mg by mouth Daily., Disp: , Rfl: 2         No Follow-up on file.

## 2020-04-28 ENCOUNTER — APPOINTMENT (OUTPATIENT)
Dept: GENERAL RADIOLOGY | Facility: HOSPITAL | Age: 40
End: 2020-04-28

## 2020-04-28 ENCOUNTER — TELEPHONE (OUTPATIENT)
Dept: CARDIOLOGY | Facility: CLINIC | Age: 40
End: 2020-04-28

## 2020-04-28 ENCOUNTER — HOSPITAL ENCOUNTER (EMERGENCY)
Facility: HOSPITAL | Age: 40
Discharge: HOME | End: 2020-04-28
Attending: EMERGENCY MEDICINE | Admitting: EMERGENCY MEDICINE

## 2020-04-28 VITALS
HEIGHT: 70 IN | TEMPERATURE: 97.2 F | WEIGHT: 201 LBS | RESPIRATION RATE: 20 BRPM | SYSTOLIC BLOOD PRESSURE: 141 MMHG | HEART RATE: 101 BPM | BODY MASS INDEX: 28.77 KG/M2 | OXYGEN SATURATION: 94 % | DIASTOLIC BLOOD PRESSURE: 105 MMHG

## 2020-04-28 DIAGNOSIS — R00.2 PALPITATIONS: Primary | ICD-10-CM

## 2020-04-28 DIAGNOSIS — F43.0 STRESS RESPONSE: ICD-10-CM

## 2020-04-28 LAB
ANION GAP SERPL CALCULATED.3IONS-SCNC: 14.3 MMOL/L (ref 5–15)
BASOPHILS # BLD AUTO: 0.02 10*3/MM3 (ref 0–0.2)
BASOPHILS NFR BLD AUTO: 0.4 % (ref 0–1.5)
BUN BLD-MCNC: 8 MG/DL (ref 6–20)
BUN/CREAT SERPL: 7.7 (ref 7–25)
CALCIUM SPEC-SCNC: 8.9 MG/DL (ref 8.6–10.5)
CHLORIDE SERPL-SCNC: 104 MMOL/L (ref 98–107)
CO2 SERPL-SCNC: 26.7 MMOL/L (ref 22–29)
CREAT BLD-MCNC: 1.04 MG/DL (ref 0.76–1.27)
DEPRECATED RDW RBC AUTO: 43.8 FL (ref 37–54)
EOSINOPHIL # BLD AUTO: 0.02 10*3/MM3 (ref 0–0.4)
EOSINOPHIL NFR BLD AUTO: 0.4 % (ref 0.3–6.2)
ERYTHROCYTE [DISTWIDTH] IN BLOOD BY AUTOMATED COUNT: 13.1 % (ref 12.3–15.4)
GFR SERPL CREATININE-BSD FRML MDRD: 80 ML/MIN/1.73
GLUCOSE BLD-MCNC: 106 MG/DL (ref 65–99)
HCT VFR BLD AUTO: 41.4 % (ref 37.5–51)
HGB BLD-MCNC: 14.3 G/DL (ref 13–17.7)
IMM GRANULOCYTES # BLD AUTO: 0.01 10*3/MM3 (ref 0–0.05)
IMM GRANULOCYTES NFR BLD AUTO: 0.2 % (ref 0–0.5)
LYMPHOCYTES # BLD AUTO: 1.99 10*3/MM3 (ref 0.7–3.1)
LYMPHOCYTES NFR BLD AUTO: 41.5 % (ref 19.6–45.3)
MAGNESIUM SERPL-MCNC: 2.1 MG/DL (ref 1.6–2.6)
MCH RBC QN AUTO: 31.9 PG (ref 26.6–33)
MCHC RBC AUTO-ENTMCNC: 34.5 G/DL (ref 31.5–35.7)
MCV RBC AUTO: 92.4 FL (ref 79–97)
MONOCYTES # BLD AUTO: 0.42 10*3/MM3 (ref 0.1–0.9)
MONOCYTES NFR BLD AUTO: 8.8 % (ref 5–12)
NEUTROPHILS # BLD AUTO: 2.34 10*3/MM3 (ref 1.7–7)
NEUTROPHILS NFR BLD AUTO: 48.7 % (ref 42.7–76)
NRBC BLD AUTO-RTO: 0 /100 WBC (ref 0–0.2)
NT-PROBNP SERPL-MCNC: 21.2 PG/ML (ref 5–450)
PLATELET # BLD AUTO: 184 10*3/MM3 (ref 140–450)
PMV BLD AUTO: 10.2 FL (ref 6–12)
POTASSIUM BLD-SCNC: 4.2 MMOL/L (ref 3.5–5.2)
RBC # BLD AUTO: 4.48 10*6/MM3 (ref 4.14–5.8)
SODIUM BLD-SCNC: 145 MMOL/L (ref 136–145)
TROPONIN T SERPL-MCNC: <0.01 NG/ML (ref 0–0.03)
WBC NRBC COR # BLD: 4.8 10*3/MM3 (ref 3.4–10.8)

## 2020-04-28 PROCEDURE — 93010 ELECTROCARDIOGRAM REPORT: CPT | Performed by: INTERNAL MEDICINE

## 2020-04-28 PROCEDURE — 83880 ASSAY OF NATRIURETIC PEPTIDE: CPT | Performed by: EMERGENCY MEDICINE

## 2020-04-28 PROCEDURE — 99284 EMERGENCY DEPT VISIT MOD MDM: CPT

## 2020-04-28 PROCEDURE — 85025 COMPLETE CBC W/AUTO DIFF WBC: CPT | Performed by: EMERGENCY MEDICINE

## 2020-04-28 PROCEDURE — 80048 BASIC METABOLIC PNL TOTAL CA: CPT | Performed by: EMERGENCY MEDICINE

## 2020-04-28 PROCEDURE — 71045 X-RAY EXAM CHEST 1 VIEW: CPT

## 2020-04-28 PROCEDURE — 84484 ASSAY OF TROPONIN QUANT: CPT | Performed by: EMERGENCY MEDICINE

## 2020-04-28 PROCEDURE — 93005 ELECTROCARDIOGRAM TRACING: CPT | Performed by: EMERGENCY MEDICINE

## 2020-04-28 PROCEDURE — 83735 ASSAY OF MAGNESIUM: CPT | Performed by: EMERGENCY MEDICINE

## 2020-04-28 PROCEDURE — 93005 ELECTROCARDIOGRAM TRACING: CPT

## 2020-04-28 NOTE — TELEPHONE ENCOUNTER
Pt called to report that he was having CP, palpitations and SOA. I advise the pt to go to the ER.    Thanks Estephanie

## 2020-04-29 ENCOUNTER — OFFICE VISIT (OUTPATIENT)
Dept: CARDIOLOGY | Facility: CLINIC | Age: 40
End: 2020-04-29

## 2020-04-29 ENCOUNTER — HOSPITAL ENCOUNTER (OUTPATIENT)
Dept: CARDIOLOGY | Facility: HOSPITAL | Age: 40
Discharge: HOME | End: 2020-04-29
Admitting: INTERNAL MEDICINE

## 2020-04-29 VITALS
HEART RATE: 94 BPM | DIASTOLIC BLOOD PRESSURE: 102 MMHG | BODY MASS INDEX: 29.92 KG/M2 | HEIGHT: 70 IN | SYSTOLIC BLOOD PRESSURE: 176 MMHG | WEIGHT: 209 LBS

## 2020-04-29 DIAGNOSIS — R07.2 PRECORDIAL PAIN: ICD-10-CM

## 2020-04-29 DIAGNOSIS — R03.0 ELEVATED BLOOD PRESSURE READING IN OFFICE WITHOUT DIAGNOSIS OF HYPERTENSION: ICD-10-CM

## 2020-04-29 DIAGNOSIS — R07.2 PRECORDIAL PAIN: Primary | ICD-10-CM

## 2020-04-29 DIAGNOSIS — R00.0 TACHYCARDIA: ICD-10-CM

## 2020-04-29 LAB
BH CV STRESS BP STAGE 1: NORMAL
BH CV STRESS BP STAGE 2: NORMAL
BH CV STRESS BP STAGE 3: NORMAL
BH CV STRESS BP STAGE 4: NORMAL
BH CV STRESS DURATION MIN STAGE 1: 3
BH CV STRESS DURATION MIN STAGE 2: 3
BH CV STRESS DURATION MIN STAGE 3: 3
BH CV STRESS DURATION MIN STAGE 4: 3
BH CV STRESS DURATION SEC STAGE 1: 0
BH CV STRESS DURATION SEC STAGE 2: 0
BH CV STRESS DURATION SEC STAGE 3: 0
BH CV STRESS DURATION SEC STAGE 4: 0
BH CV STRESS GRADE STAGE 1: 10
BH CV STRESS GRADE STAGE 2: 12
BH CV STRESS GRADE STAGE 3: 14
BH CV STRESS GRADE STAGE 4: 16
BH CV STRESS HR STAGE 1: 118
BH CV STRESS HR STAGE 2: 133
BH CV STRESS HR STAGE 3: 160
BH CV STRESS HR STAGE 4: 185
BH CV STRESS METS STAGE 1: 5
BH CV STRESS METS STAGE 2: 7.5
BH CV STRESS METS STAGE 3: 10
BH CV STRESS METS STAGE 4: 13.5
BH CV STRESS PROTOCOL 1: NORMAL
BH CV STRESS RECOVERY BP: NORMAL MMHG
BH CV STRESS RECOVERY HR: 110 BPM
BH CV STRESS SPEED STAGE 1: 1.7
BH CV STRESS SPEED STAGE 2: 2.5
BH CV STRESS SPEED STAGE 3: 3.4
BH CV STRESS SPEED STAGE 4: 4.2
BH CV STRESS STAGE 1: 1
BH CV STRESS STAGE 2: 2
BH CV STRESS STAGE 3: 3
BH CV STRESS STAGE 4: 4
MAXIMAL PREDICTED HEART RATE: 182 BPM
PERCENT MAX PREDICTED HR: 101.65 %
STRESS BASELINE BP: NORMAL MMHG
STRESS BASELINE HR: 94 BPM
STRESS PERCENT HR: 120 %
STRESS POST ESTIMATED WORKLOAD: 13.5 METS
STRESS POST EXERCISE DUR MIN: 12 MIN
STRESS POST EXERCISE DUR SEC: 0 SEC
STRESS POST PEAK BP: NORMAL MMHG
STRESS POST PEAK HR: 185 BPM
STRESS TARGET HR: 155 BPM

## 2020-04-29 PROCEDURE — 93016 CV STRESS TEST SUPVJ ONLY: CPT | Performed by: INTERNAL MEDICINE

## 2020-04-29 PROCEDURE — 93000 ELECTROCARDIOGRAM COMPLETE: CPT | Performed by: INTERNAL MEDICINE

## 2020-04-29 PROCEDURE — 93018 CV STRESS TEST I&R ONLY: CPT | Performed by: INTERNAL MEDICINE

## 2020-04-29 PROCEDURE — 99214 OFFICE O/P EST MOD 30 MIN: CPT | Performed by: INTERNAL MEDICINE

## 2020-04-29 PROCEDURE — 93017 CV STRESS TEST TRACING ONLY: CPT

## 2020-04-29 RX ORDER — ATENOLOL 25 MG/1
25 TABLET ORAL DAILY
Qty: 30 TABLET | Refills: 3 | Status: SHIPPED | OUTPATIENT
Start: 2020-04-29 | End: 2020-08-31

## 2020-04-29 NOTE — PROGRESS NOTES
Date of Office Visit: 20  Encounter Provider: Darian Rush MD  Place of Service: [facility] CARDIOLOGY  Patient Name: Altagracia Berry  :1980    Chief Complaint   Patient presents with   • Chest Pain   • Shortness of Breath   :     HPI:     Mr. Berry is 39 y.o. and presents today for an urgent visit after an ER visit.  He was seen by Dr Hdz in 2019 for episodic tachycardia.  As the episodes were infrequent, they discussed the possibility of the AliveCor device.  He reported two different types of tachycardia; one was something that happened with exercise, and his rate would abruptly jump to 200 bpm, like a switch was flipped, and he would feel terrible until it passed (which it would stop abruptly as well).  He also had a mild low level tachycardia that was attributed to sinus tach from stress.  At baseline, he is an anxious man.      He had not exercised after that and has not had the super rapid episodes. He has caught some mild sinus tach on his device.    He is the  of [employer] for the [employer].  He is under a profound amount of stress right now; his anxiety is practically palpable.  He isn't sleeping.  He denies drinking excess alcohol.      Prior to the COVID-19 pandemic, his BP was 120/80s.  Now he's consistently running quite high.      He has had chest tightness for several days, as well as elevated heart rate.  He was seen in the ED and his evaluation was fine except for sinus tach and hypertension.     Past Medical History:   Diagnosis Date   • Allergic rhinitis    • Anxiety and depression    • Decreased libido    • Deviated septum    • Eczema    • Erectile dysfunction    • LUCÍA (generalized anxiety disorder)    • History of exercise stress test    • History of myringotomy    • History of palpitations     SAW DR DELONG, HAD NEGATIVE STRESS TEST, WAS DETERMINED TO BE ANXIETY   • Hyperlipidemia    • Hypertrophy tonsils     • Migraine    • Panic attacks    • Patellofemoral syndrome    • Sebaceous cyst    • Sleep apnea    • Thrombocytopenia (CMS/HCC)        Past Surgical History:   Procedure Laterality Date   • EAR TUBES     • MYRINGOTOMY      x 2   • SEPTOPLASTY Bilateral 6/19/2018    Procedure: SEPTOPLASTY TURBINATE COBLATION;  Surgeon: Delano Nunez MD;  [place] [facility] [place] [facility] [place]  Service: E[place]   • TONSILLECTOMY AND ADENOIDECTOMY Bilateral 6/19/2018    Procedure: TONSILLECTOMY;  Surgeon: Delano Nunez MD;  [place] [facility] [place] [facility] [place]  Service: ENT   • WISDOM TOOTH EXTRACTION         Social History     Socioeconomic History   • Marital status:      Spouse name: Not on file   • Number of children: Not on file   • Years of education: Not on file   • Highest education level: Not on file   Tobacco Use   • Smoking status: Former Smoker     Packs/day: 1.00     Years: 9.00     Pack years: 9.00     Types: Cigarettes   • Smokeless tobacco: Current User     Types: Chew   • Tobacco comment: QUIT 9 YEARS AGO   Substance and Sexual Activity   • Alcohol use: Yes     Comment: SOCIALLY-2 drinks per week   • Drug use: No   • Sexual activity: Defer       Family History   Problem Relation Age of Onset   • Breast cancer Mother    • Leukemia Father         chronic lymphoid leukemia   • No Known Problems Other    • Malig Hyperthermia Neg Hx        Review of Systems   Cardiovascular: Positive for chest pain and palpitations.   Psychiatric/Behavioral: The patient is nervous/anxious.    All other systems reviewed and are negative.      No Known Allergies      Current Outpatient Medications:   •  ALPRAZolam (XANAX) 0.5 MG tablet, Take 0.5 mg by mouth As Needed for Anxiety., Disp: , Rfl:   •  buPROPion XL (WELLBUTRIN XL) 150 MG 24 hr tablet, Take 150 mg by mouth Daily., Disp: , Rfl: 1  •  cetirizine (zyrTEC) 10 MG tablet, Take 1 tablet by mouth Daily., Disp: , Rfl:   •  clomiPHENE (CLOMID) 50 MG tablet, Take 25 mg by mouth Daily., Disp: ,  "Rfl: 5  •  pravastatin (PRAVACHOL) 20 MG tablet, Take 1 tablet by mouth Daily., Disp: 90 tablet, Rfl: 3  •  sildenafil (VIAGRA) 100 MG tablet, Take 1 tablet by mouth Daily As Needed for erectile dysfunction., Disp: , Rfl:   •  traZODone (DESYREL) 100 MG tablet, Take 100 mg by mouth every night at bedtime., Disp: , Rfl: 1  •  VIIBRYD 40 MG tablet tablet, Take 40 mg by mouth Daily., Disp: , Rfl: 2      Objective:     Vitals:    04/29/20 1441 04/29/20 1453   BP: (!) 176/102    BP Location: Right arm    Pulse: 87 94   Weight: 94.8 kg (209 lb)    Height: 177.8 cm (70\")      Body mass index is 29.99 kg/m².    Physical Exam   Constitutional: He is oriented to person, place, and time. He appears well-developed and well-nourished.   HENT:   Head: Normocephalic.   Nose: Nose normal.   Mouth/Throat: Oropharynx is clear and moist.   Eyes: Pupils are equal, round, and reactive to light. Conjunctivae and EOM are normal.   Neck: Normal range of motion. No JVD present.   Cardiovascular: Normal rate, regular rhythm, normal heart sounds and intact distal pulses.   No murmur heard.  Pulmonary/Chest: Effort normal and breath sounds normal.   Abdominal: Soft. There is no tenderness.   Musculoskeletal: Normal range of motion. He exhibits no edema.   Neurological: He is alert and oriented to person, place, and time. No cranial nerve deficit.   Skin: Skin is warm and dry. No rash noted.   Psychiatric: His behavior is normal. Judgment and thought content normal. His mood appears anxious.   Vitals reviewed.        ECG 12 Lead  Date/Time: 4/29/2020 3:03 PM  Performed by: Darian Rush MD  Authorized by: Darian Rush MD   Comparison: compared with previous ECG   Similar to previous ECG  Rhythm: sinus rhythm  Conduction: conduction normal  ST Segments: ST segments normal  T Waves: T waves normal  QRS axis: normal  Other: no other findings    Clinical impression: normal ECG              Assessment:       Diagnosis Plan   1. Precordial pain  " ECG 12 Lead    Treadmill Stress Test   2. Tachycardia  Treadmill Stress Test   3. Elevated blood pressure reading in office without diagnosis of hypertension            Plan:       Mr Berry is under a profound amount of stress right now.  His BP was previously normal and now it's high.  He has sinus tachy, due to anxiety.      I put him on the treadmill.  He exercised 12 minutes and reached 100% of his maximum.  There were no ischemic EKG changes or SVT.     I think he is just in adrenaline overload.  We are going to start a beta blocker to try to help.      He was previously describing what sounds like exercise-induced SVT; he hasn't had it for a while.  He has an AliveCor and if he has another, we can catch it.  Hopefully the beta blocker will help.    We'll get him on for a tele visit next week.     Sincerely,       Darian Rush MD

## 2020-05-05 ENCOUNTER — TELEPHONE (OUTPATIENT)
Dept: CARDIOLOGY | Facility: CLINIC | Age: 40
End: 2020-05-05

## 2020-05-06 ENCOUNTER — TELEMEDICINE (OUTPATIENT)
Dept: CARDIOLOGY | Facility: CLINIC | Age: 40
End: 2020-05-06

## 2020-05-06 VITALS
HEART RATE: 88 BPM | HEIGHT: 70 IN | DIASTOLIC BLOOD PRESSURE: 85 MMHG | WEIGHT: 202 LBS | SYSTOLIC BLOOD PRESSURE: 129 MMHG | BODY MASS INDEX: 28.92 KG/M2

## 2020-05-06 DIAGNOSIS — F41.1 GAD (GENERALIZED ANXIETY DISORDER): ICD-10-CM

## 2020-05-06 DIAGNOSIS — R00.0 SINUS TACHYCARDIA: ICD-10-CM

## 2020-05-06 DIAGNOSIS — I47.1 PSVT (PAROXYSMAL SUPRAVENTRICULAR TACHYCARDIA) (HCC): ICD-10-CM

## 2020-05-06 DIAGNOSIS — R03.0 TRANSIENT ELEVATED BLOOD PRESSURE: ICD-10-CM

## 2020-05-06 DIAGNOSIS — R00.2 PALPITATIONS: Primary | ICD-10-CM

## 2020-05-06 PROCEDURE — 99214 OFFICE O/P EST MOD 30 MIN: CPT | Performed by: NURSE PRACTITIONER

## 2020-05-06 NOTE — PROGRESS NOTES
Telehealth Visit    Date of Visit: 2020  Encounter Provider: KAYLIE Al  Place of Service: [facility] CARDIOLOGY  Patient Name: Altagracia Berry  :1980  Primary Cardiologist: Dr. Shane Hdz    Chief Complaint   Patient presents with   • Palpitations   • Chest Pain   • Follow-up   :     Dear Dr. Sid Layne,     HPI: Altagracia Berry is a pleasant 39 y.o. male who is an established patient of our practice. Due to COVID-19 virus, I am conducting a telehealth visit via video with patient and he has consented to this visit today. He is a new patient to me and his previous records have been reviewed.    In 2019, he was evaluated by Dr. Shane Hdz for episodic tachycardia.  He was having 2 types of tachycardia when that happens with exercise and another mild low level tachycardia that was attributed to stress.  He has a history of anxiety and follows with [facility] psychiatry.    On 2020, he saw Dr. Darian Rush in the office for an urgent visit.  He reported that he is the  of [employer] for the [employer] was under profound amount of stress due to the COVID-19 pandemic.  He had recently been to the ED for chest tightness and elevated heart rate.  He presented to the [facility] emergency department and was noted to have sinus tachycardia and hypertension.  A treadmill stress test was obtained and he exercised for 12 minutes on Joby protocol reaching 100% of maximum heart rate with no ischemic EKG changes or SVT.  Dr. Mace felt that he had adrenaline overload and started him on atenolol 25 mg daily.  She also noted that he may have exercise-induced SVT and he will use his AliveCor if he has another event.    Days is 1 week a telehealth follow-up visit.  He explains that the job was physically and mentally stressful and he resigned yesterday.  He said the physical symptoms really scared him this time.  He feels much better  today.  He denies any further chest pain or palpitations.  He is tolerating the atenolol just fine and heart rate and blood pressure are normal.  He is wondering if needs to stay on the atenolol for extended amount of time.    Past Medical History:   Diagnosis Date   • Allergic rhinitis    • Anxiety and depression    • Decreased libido    • Deviated septum    • Eczema    • Erectile dysfunction    • LUCÍA (generalized anxiety disorder)    • History of exercise stress test    • History of myringotomy    • History of palpitations     SAW DR DELONG, HAD NEGATIVE STRESS TEST, WAS DETERMINED TO BE ANXIETY   • Hyperlipidemia    • Hypertrophy tonsils    • Migraine    • Panic attacks    • Patellofemoral syndrome    • Sebaceous cyst    • Sleep apnea    • Thrombocytopenia (CMS/HCC)        Past Surgical History:   Procedure Laterality Date   • EAR TUBES     • MYRINGOTOMY      x 2   • SEPTOPLASTY Bilateral 6/19/2018    Procedure: SEPTOPLASTY TURBINATE COBLATION;  Surgeon: Delano Nunez MD;  [place] [facility] [place] [facility] [place]   • TONSILLECTOMY AND ADENOIDECTOMY Bilateral 6/19/2018    Procedure: TONSILLECTOMY;  Surgeon: Delano Nunez MD;  [place] [facility] [place] [facility] [place]  Service: E[place]   • WISDOM TOOTH EXTRACTION         Social History     Socioeconomic History   • Marital status:      Spouse name: Not on file   • Number of children: Not on file   • Years of education: Not on file   • Highest education level: Not on file   Tobacco Use   • Smoking status: Former Smoker     Packs/day: 1.00     Years: 9.00     Pack years: 9.00     Types: Cigarettes     Last attempt to quit: 2010     Years since quitting: 10.3   • Smokeless tobacco: Current User     Types: Chew   • Tobacco comment: QUIT 9 YEARS AGO   Substance and Sexual Activity   • Alcohol use: Not Currently     Alcohol/week: 6.0 standard drinks     Types: 6 Cans of beer per week     Comment: SOCIALLY//   • Drug use: No   • Sexual activity: Yes     Partners: Female  "      Family History   Problem Relation Age of Onset   • Breast cancer Mother    • Leukemia Father         chronic lymphoid leukemia   • No Known Problems Other    • Malig Hyperthermia Neg Hx        The following portion of the patient's history were reviewed and updated as appropriate: past medical history, past surgical history, past social history, past family history, allergies, current medications, and problem list.    Review of Systems   Constitution: Negative.   Cardiovascular: Negative.    Respiratory: Negative.    Endocrine: Negative.    Gastrointestinal: Negative.    Neurological: Negative.        No Known Allergies      Current Outpatient Medications:   •  ALPRAZolam (XANAX) 0.5 MG tablet, Take 0.5 mg by mouth As Needed for Anxiety., Disp: , Rfl:   •  atenolol (TENORMIN) 25 MG tablet, Take 1 tablet by mouth Daily., Disp: 30 tablet, Rfl: 3  •  buPROPion XL (WELLBUTRIN XL) 150 MG 24 hr tablet, Take 150 mg by mouth Daily., Disp: , Rfl: 1  •  cetirizine (zyrTEC) 10 MG tablet, Take 1 tablet by mouth Daily., Disp: , Rfl:   •  pravastatin (PRAVACHOL) 20 MG tablet, Take 1 tablet by mouth Daily., Disp: 90 tablet, Rfl: 3  •  sildenafil (VIAGRA) 100 MG tablet, Take 1 tablet by mouth Daily As Needed for erectile dysfunction., Disp: , Rfl:   •  traZODone (DESYREL) 100 MG tablet, Take 100 mg by mouth every night at bedtime., Disp: , Rfl: 1  •  VIIBRYD 40 MG tablet tablet, Take 40 mg by mouth Daily., Disp: , Rfl: 2        Objective:     Vitals:    05/06/20 1237   BP: 129/85   BP Location: Left arm   Pulse: 88   Weight: 91.6 kg (202 lb)   Height: 177.8 cm (70\")     Body mass index is 28.98 kg/m².    Due to telehealth visit, there was no EKG, vitals, or weight performed in our office.  Vitals/Weight were reported by the patient and conducted at home.     PHYSICAL EXAM:    Vitals Reviewed  General Appearance: No acute distress, well developed and well nourished.   Eyes: Conjunctivae and lids: No erythema, swelling, or " discharge. Sclerae anicteric.   HENT: Atraumatic, normocephalic. External eyes, ears, and nose normal. No hearing loss noted. Mucous membranes normal. Lips not cyanotic.   Respiratory: No signs of respiratory distress. Respiration rhythm and depth normal.   Skin: General appearance normal. No pallor, cyanosis, diaphoresis.   Psychiatric: Patient alert and oriented to person, place, and time. Speech and behavior appropriate. Normal mood and affect.        Assessment:       Diagnosis Plan   1. Palpitations     2. Sinus tachycardia     3. PSVT (paroxysmal supraventricular tachycardia) (CMS/HCC)     4. Transient elevated blood pressure     5. LUCÍA (generalized anxiety disorder)            Plan:       1/2.  Palpitations and Tachycardia: He recently had sinus tachycardia with an increased amount of stress from his job.  He resigned from the job yesterday and already feels better.  He denies any further tachycardia in the atenolol has really helped him as well.  He would like to reevaluate if he needs to continue with the atenolol in 30 days since he thinks his stress will be much less.    3.  PSVT: If he has another event of palpitations in which he feels is exercise-induced PSVT he will use his AliveCor.     4.  Transient Elevated Blood Pressure: He thinks it was stress-induced and will continue to monitor.    5.  Generalized Anxiety Disorder: Follows with [facility] psychiatry.  He says if he has any further episodes that he feels are panic attacks that he will reach out to his psychiatrist.    6.  I recommended follow-up with me in 1 month for reevaluation of his symptoms and atenolol.    This patient has consented to a telehealth visit via video. The visit was scheduled as a video visit to comply with patient safety concerns in accordance with CDC recommendations.  All vitals recorded within this visit are reported by the patient.  I spent 25 minutes in total including but not limited to the 9 minutes spent in direct  conversation with this patient.      As always, it has been a pleasure to participate in your patient's care. Thank you.       Sincerely,         KAYLIE Reyna        Dictated utilizing Dragon dictation

## 2020-05-06 NOTE — PROGRESS NOTES
William jansen, he quit!  That's going to be much better for his long-term health but wow.  Glad to hear the atenolol is helping and agree that he may not need it long term.    JDK

## 2020-08-31 RX ORDER — ATENOLOL 25 MG/1
TABLET ORAL
Qty: 30 TABLET | Refills: 3 | Status: SHIPPED | OUTPATIENT
Start: 2020-08-31 | End: 2020-11-05 | Stop reason: SDUPTHER

## 2020-11-03 RX ORDER — PRAVASTATIN SODIUM 20 MG
TABLET ORAL
Qty: 90 TABLET | Refills: 3 | Status: SHIPPED | OUTPATIENT
Start: 2020-11-03

## 2020-11-05 ENCOUNTER — TELEMEDICINE (OUTPATIENT)
Dept: FAMILY MEDICINE CLINIC | Facility: CLINIC | Age: 40
End: 2020-11-05

## 2020-11-05 VITALS
DIASTOLIC BLOOD PRESSURE: 85 MMHG | SYSTOLIC BLOOD PRESSURE: 127 MMHG | BODY MASS INDEX: 28.92 KG/M2 | HEART RATE: 80 BPM | HEIGHT: 70 IN | RESPIRATION RATE: 12 BRPM | WEIGHT: 202 LBS

## 2020-11-05 DIAGNOSIS — F41.1 GAD (GENERALIZED ANXIETY DISORDER): Primary | ICD-10-CM

## 2020-11-05 DIAGNOSIS — Z79.899 HIGH RISK MEDICATION USE: ICD-10-CM

## 2020-11-05 DIAGNOSIS — I10 ESSENTIAL HYPERTENSION: ICD-10-CM

## 2020-11-05 DIAGNOSIS — E78.2 MIXED HYPERLIPIDEMIA: ICD-10-CM

## 2020-11-05 PROCEDURE — 99213 OFFICE O/P EST LOW 20 MIN: CPT | Performed by: FAMILY MEDICINE

## 2020-11-05 RX ORDER — LITHIUM CARBONATE 300 MG/1
300 CAPSULE ORAL 2 TIMES DAILY WITH MEALS
Start: 2020-11-05

## 2020-11-05 RX ORDER — ATENOLOL 25 MG/1
25 TABLET ORAL DAILY
Qty: 90 TABLET | Refills: 3 | Status: SHIPPED | OUTPATIENT
Start: 2020-11-05

## 2020-11-05 NOTE — PROGRESS NOTES
No chief complaint on file.      Subjective   Altagracia Berry is a 39 y.o. male.     History of Present Illness   Video vist due to covid.  Consent obtained.   22 minute visit.     F/U LUCÍA.  Seeing psychiatry at [facility].  Started on lithium.  Dong well with meds.     F/U hyperlipidemia.  No myalgias.  Doing wel liwht meds.    F/U HTN.  Doing well with meds.  No Se.    The following portions of the patient's history were reviewed and updated as appropriate: allergies, current medications, past family history, past medical history, past social history, past surgical history and problem list.    Review of Systems   Constitutional: Negative for appetite change and fatigue.   HENT: Negative for nosebleeds and sore throat.    Eyes: Negative for blurred vision and visual disturbance.   Respiratory: Negative for shortness of breath and wheezing.    Cardiovascular: Negative for chest pain and leg swelling.   Gastrointestinal: Negative for abdominal distention and abdominal pain.   Endocrine: Negative for cold intolerance and polyuria.   Genitourinary: Negative for dysuria and hematuria.   Musculoskeletal: Negative for arthralgias and myalgias.   Skin: Negative for color change and rash.   Neurological: Negative for weakness and confusion.   Psychiatric/Behavioral: Negative for agitation and depressed mood.       Patient Active Problem List   Diagnosis   • LUCÍA (generalized anxiety disorder)   • Erectile dysfunction   • Allergic rhinitis   • Hyperlipidemia   • Panic attacks   • Sleep apnea   • Thrombocytopenia (CMS/HCC)   • Essential hypertension   • High risk medication use       No Known Allergies      Current Outpatient Medications:   •  ALPRAZolam (XANAX) 0.5 MG tablet, Take 0.5 mg by mouth As Needed for Anxiety., Disp: , Rfl:   •  atenolol (TENORMIN) 25 MG tablet, Take 1 tablet by mouth Daily., Disp: 90 tablet, Rfl: 3  •  buPROPion XL (WELLBUTRIN XL) 150 MG 24 hr tablet, Take 150 mg by mouth Daily., Disp: , Rfl: 1  •   cetirizine (zyrTEC) 10 MG tablet, Take 1 tablet by mouth Daily., Disp: , Rfl:   •  lithium carbonate 300 MG capsule, Take 1 capsule by mouth 2 (Two) Times a Day With Meals., Disp: , Rfl:   •  pravastatin (PRAVACHOL) 20 MG tablet, TAKE 1 TABLET BY MOUTH EVERY DAY, Disp: 90 tablet, Rfl: 3  •  sildenafil (VIAGRA) 100 MG tablet, Take 1 tablet by mouth Daily As Needed for erectile dysfunction., Disp: , Rfl:   •  traZODone (DESYREL) 100 MG tablet, Take 100 mg by mouth every night at bedtime., Disp: , Rfl: 1  •  VIIBRYD 40 MG tablet tablet, Take 40 mg by mouth Daily., Disp: , Rfl: 2    Past Medical History:   Diagnosis Date   • Allergic rhinitis    • Anxiety and depression    • Decreased libido    • Deviated septum    • Eczema    • Erectile dysfunction    • LUCÍA (generalized anxiety disorder)    • History of exercise stress test    • History of myringotomy    • History of palpitations     SAW DR DELONG, HAD NEGATIVE STRESS TEST, WAS DETERMINED TO BE ANXIETY   • Hyperlipidemia    • Hypertrophy tonsils    • Migraine    • Panic attacks    • Patellofemoral syndrome    • Sebaceous cyst    • Sleep apnea    • Thrombocytopenia (CMS/HCC)        Past Surgical History:   Procedure Laterality Date   • EAR TUBES     • MYRINGOTOMY      x 2   • SEPTOPLASTY Bilateral 6/19/2018    Procedure: SEPTOPLASTY TURBINATE COBLATION;  Surgeon: Delano Nunez MD;  Location: [facility];  Service: ENT   • TONSILLECTOMY AND ADENOIDECTOMY Bilateral 6/19/2018    Procedure: TONSILLECTOMY;  Surgeon: Delano Nunez MD;  Location: [facility];  Service: ENT   • WISDOM TOOTH EXTRACTION         Family History   Problem Relation Age of Onset   • Breast cancer Mother    • Leukemia Father         chronic lymphoid leukemia   • No Known Problems Other    • Malig Hyperthermia Neg Hx        Social History     Tobacco Use   • Smoking status: Former Smoker     Packs/day: 1.00     Years: 9.00     Pack years: 9.00     Types: Cigarettes     Quit date:  2010     Years since quitting: 10.8   • Smokeless tobacco: Current User     Types: Chew   • Tobacco comment: QUIT 9 YEARS AGO   Substance Use Topics   • Alcohol use: Not Currently     Alcohol/week: 6.0 standard drinks     Types: 6 Cans of beer per week     Comment: SOCIALLY//            Objective     Vitals:    11/05/20 0815   BP: 127/85   Pulse: 80   Resp: 12     Body mass index is 28.98 kg/m².    Physical Exam    Lab Results   Component Value Date    GLUCOSE 106 (H) 04/28/2020    BUN 8 04/28/2020    CREATININE 1.04 04/28/2020    EGFRIFNONA 80 04/28/2020    EGFRIFAFRI 76 10/23/2019    BCR 7.7 04/28/2020    K 4.2 04/28/2020    CO2 26.7 04/28/2020    CALCIUM 8.9 04/28/2020    PROTENTOTREF 7.4 10/23/2019    ALBUMIN 5.00 10/23/2019    LABIL2 2.1 10/23/2019    AST 22 10/23/2019    ALT 33 10/23/2019       WBC   Date Value Ref Range Status   04/28/2020 4.80 3.40 - 10.80 10*3/mm3 Final     RBC   Date Value Ref Range Status   04/28/2020 4.48 4.14 - 5.80 10*6/mm3 Final     Hemoglobin   Date Value Ref Range Status   04/28/2020 14.3 13.0 - 17.7 g/dL Final     Hematocrit   Date Value Ref Range Status   04/28/2020 41.4 37.5 - 51.0 % Final     MCV   Date Value Ref Range Status   04/28/2020 92.4 79.0 - 97.0 fL Final     MCH   Date Value Ref Range Status   04/28/2020 31.9 26.6 - 33.0 pg Final     MCHC   Date Value Ref Range Status   04/28/2020 34.5 31.5 - 35.7 g/dL Final     RDW   Date Value Ref Range Status   04/28/2020 13.1 12.3 - 15.4 % Final     RDW-SD   Date Value Ref Range Status   04/28/2020 43.8 37.0 - 54.0 fl Final     MPV   Date Value Ref Range Status   04/28/2020 10.2 6.0 - 12.0 fL Final     Platelets   Date Value Ref Range Status   04/28/2020 184 140 - 450 10*3/mm3 Final     Neutrophil %   Date Value Ref Range Status   04/28/2020 48.7 42.7 - 76.0 % Final     Lymphocyte %   Date Value Ref Range Status   04/28/2020 41.5 19.6 - 45.3 % Final     Monocyte %   Date Value Ref Range Status   04/28/2020 8.8 5.0 - 12.0 % Final      Eosinophil %   Date Value Ref Range Status   04/28/2020 0.4 0.3 - 6.2 % Final     Basophil %   Date Value Ref Range Status   04/28/2020 0.4 0.0 - 1.5 % Final     Immature Grans %   Date Value Ref Range Status   04/28/2020 0.2 0.0 - 0.5 % Final     Neutrophils, Absolute   Date Value Ref Range Status   04/28/2020 2.34 1.70 - 7.00 10*3/mm3 Final     Lymphocytes, Absolute   Date Value Ref Range Status   04/28/2020 1.99 0.70 - 3.10 10*3/mm3 Final     Monocytes, Absolute   Date Value Ref Range Status   04/28/2020 0.42 0.10 - 0.90 10*3/mm3 Final     Eosinophils, Absolute   Date Value Ref Range Status   04/28/2020 0.02 0.00 - 0.40 10*3/mm3 Final     Basophils, Absolute   Date Value Ref Range Status   04/28/2020 0.02 0.00 - 0.20 10*3/mm3 Final     Immature Grans, Absolute   Date Value Ref Range Status   04/28/2020 0.01 0.00 - 0.05 10*3/mm3 Final     nRBC   Date Value Ref Range Status   04/28/2020 0.0 0.0 - 0.2 /100 WBC Final       No results found for: HGBA1C    No results found for: RSHHKLRH75    TSH   Date Value Ref Range Status   10/23/2019 2.950 0.270 - 4.200 uIU/mL Final       No results found for: CHOL  Lab Results   Component Value Date    TRIG 82 10/23/2019     Lab Results   Component Value Date    HDL 43 10/23/2019     Lab Results   Component Value Date     (H) 10/23/2019     Lab Results   Component Value Date    VLDL 16.4 10/23/2019     No results found for: LDLHDL      Procedures    Assessment/Plan   Problems Addressed this Visit        Cardiovascular and Mediastinum    Hyperlipidemia    Relevant Orders    Comprehensive Metabolic Panel    Lipid Panel With / Chol / HDL Ratio    Essential hypertension    Relevant Medications    atenolol (TENORMIN) 25 MG tablet       Other    LUCÍA (generalized anxiety disorder) - Primary    Relevant Medications    lithium carbonate 300 MG capsule    High risk medication use    Relevant Orders    Comprehensive Metabolic Panel    Lipid Panel With / Chol / HDL Ratio    Lithium  level    TSH Rfx On Abnormal To Free T4      Diagnoses       Codes Comments    LUCÍA (generalized anxiety disorder)    -  Primary ICD-10-CM: F41.1  ICD-9-CM: 300.02     Essential hypertension     ICD-10-CM: I10  ICD-9-CM: 401.9     Mixed hyperlipidemia     ICD-10-CM: E78.2  ICD-9-CM: 272.2     High risk medication use     ICD-10-CM: Z79.899  ICD-9-CM: V58.69           Orders Placed This Encounter   Procedures   • Comprehensive Metabolic Panel     Standing Status:   Future     Standing Expiration Date:   11/5/2021   • Lipid Panel With / Chol / HDL Ratio     Standing Status:   Future     Standing Expiration Date:   11/5/2021   • Lithium level     Standing Status:   Future     Standing Expiration Date:   11/5/2021   • TSH Rfx On Abnormal To Free T4     Standing Status:   Future     Standing Expiration Date:   11/5/2021       Current Outpatient Medications   Medication Sig Dispense Refill   • ALPRAZolam (XANAX) 0.5 MG tablet Take 0.5 mg by mouth As Needed for Anxiety.     • atenolol (TENORMIN) 25 MG tablet Take 1 tablet by mouth Daily. 90 tablet 3   • buPROPion XL (WELLBUTRIN XL) 150 MG 24 hr tablet Take 150 mg by mouth Daily.  1   • cetirizine (zyrTEC) 10 MG tablet Take 1 tablet by mouth Daily.     • lithium carbonate 300 MG capsule Take 1 capsule by mouth 2 (Two) Times a Day With Meals.     • pravastatin (PRAVACHOL) 20 MG tablet TAKE 1 TABLET BY MOUTH EVERY DAY 90 tablet 3   • sildenafil (VIAGRA) 100 MG tablet Take 1 tablet by mouth Daily As Needed for erectile dysfunction.     • traZODone (DESYREL) 100 MG tablet Take 100 mg by mouth every night at bedtime.  1   • VIIBRYD 40 MG tablet tablet Take 40 mg by mouth Daily.  2     No current facility-administered medications for this visit.        Altagracia Berry had no medications administered during this visit.    Return in about 6 months (around 5/5/2021).    There are no Patient Instructions on file for this visit.

## 2020-11-10 ENCOUNTER — RESULTS ENCOUNTER (OUTPATIENT)
Dept: FAMILY MEDICINE CLINIC | Facility: CLINIC | Age: 40
End: 2020-11-10

## 2020-11-10 DIAGNOSIS — E78.2 MIXED HYPERLIPIDEMIA: ICD-10-CM

## 2020-11-10 DIAGNOSIS — Z79.899 HIGH RISK MEDICATION USE: ICD-10-CM

## 2021-01-19 ENCOUNTER — TELEPHONE (OUTPATIENT)
Dept: CARDIOLOGY | Facility: CLINIC | Age: 41
End: 2021-01-19

## 2021-01-19 RX ORDER — ATENOLOL 25 MG/1
TABLET ORAL
Qty: 30 TABLET | Refills: 3 | OUTPATIENT
Start: 2021-01-19

## 2021-01-19 NOTE — TELEPHONE ENCOUNTER
Rukhsana had a telehealth visit with the patient in May 2020.  He was supposed to follow-up with me in 1 month about his atenolol.  His pharmacy is now calling for atenolol refills.  Please arrange an office visit or telehealth appointment with me.  Does he need a refill?

## 2021-01-19 NOTE — TELEPHONE ENCOUNTER
I spoke with patient.  He said his pcp fills his Atenolol and is currently following him. Patient will have his pharmacy call his pcp.  I informed Denae LOTT.  I advised patient to call us if he has any cardiac problems.  Donna

## 2021-03-08 ENCOUNTER — DOCUMENTATION (OUTPATIENT)
Dept: FAMILY MEDICINE CLINIC | Facility: CLINIC | Age: 41
End: 2021-03-08

## (undated) DEVICE — PACK NASL SINUS MEROPACK BIORESRB 4X4BX2

## (undated) DEVICE — 3M™ STERI-STRIP™ COMPOUND BENZOIN TINCTURE 40 BAGS/CARTON 4 CARTONS/CASE C1544: Brand: 3M™ STERI-STRIP™

## (undated) DEVICE — GLV SURG TRIUMPH CLASSIC PF LTX 6 STRL

## (undated) DEVICE — GLV SURG BIOGEL LTX PF 7 1/2

## (undated) DEVICE — DRESSING 440402 MEROCEL 10PK STD 8CM: Brand: MEROCEL

## (undated) DEVICE — SPNG DISSCT SECTO TONSL MD PK/5

## (undated) DEVICE — COAGULATOR SXN FTSWTCH 10F6IN

## (undated) DEVICE — SUT GUT PLN 4/0 SC1 18IN 1824H

## (undated) DEVICE — VIOLET BRAIDED (POLYGLACTIN 910), SYNTHETIC ABSORBABLE SUTURE: Brand: COATED VICRYL

## (undated) DEVICE — CODMAN® SURGICAL PATTIES 1/2" X 3" (1.27CM X 7.62CM): Brand: CODMAN®

## (undated) DEVICE — NDL HYPO PRECISIONGLIDE REG 25G 1 1/2

## (undated) DEVICE — NDL SPINE 25G 31/2IN BLU

## (undated) DEVICE — PK ENT FESS 40

## (undated) DEVICE — ELECTRD NDL EDGE/STD 2.84IN

## (undated) DEVICE — SUT GUT CHRM 4/0 RB1 27IN U203H

## (undated) DEVICE — INTENDED FOR TISSUE SEPARATION, AND OTHER PROCEDURES THAT REQUIRE A SHARP SURGICAL BLADE TO PUNCTURE OR CUT.: Brand: BARD-PARKER ® CARBON RIB-BACK BLADES

## (undated) DEVICE — WIPE INST MEROCEL

## (undated) DEVICE — SUT SILK 2/0 FS BLK 18IN 685G

## (undated) DEVICE — PK ENT 40

## (undated) DEVICE — 3M™ STERI-STRIP™ REINFORCED ADHESIVE SKIN CLOSURES, R1547, 1/2 IN X 4 IN (12 MM X 100 MM), 6 STRIPS/ENVELOPE: Brand: 3M™ STERI-STRIP™

## (undated) DEVICE — IRRIGATOR BULB ASEPTO 60CC STRL

## (undated) DEVICE — ANTI-FOG SOLUTION WITH FOAM PAD: Brand: DEVON

## (undated) DEVICE — GOWN,NON-REINFORCED,SIRUS,SET IN SLV,XL: Brand: MEDLINE